# Patient Record
Sex: FEMALE | Race: WHITE | NOT HISPANIC OR LATINO | Employment: FULL TIME | URBAN - METROPOLITAN AREA
[De-identification: names, ages, dates, MRNs, and addresses within clinical notes are randomized per-mention and may not be internally consistent; named-entity substitution may affect disease eponyms.]

---

## 2023-11-09 ENCOUNTER — OFFICE VISIT (OUTPATIENT)
Dept: FAMILY MEDICINE CLINIC | Facility: CLINIC | Age: 33
End: 2023-11-09
Payer: OTHER GOVERNMENT

## 2023-11-09 VITALS
TEMPERATURE: 98.2 F | WEIGHT: 178.4 LBS | BODY MASS INDEX: 28 KG/M2 | DIASTOLIC BLOOD PRESSURE: 80 MMHG | HEART RATE: 76 BPM | SYSTOLIC BLOOD PRESSURE: 110 MMHG | HEIGHT: 67 IN | RESPIRATION RATE: 20 BRPM

## 2023-11-09 DIAGNOSIS — S93.402D SPRAIN OF LEFT ANKLE, UNSPECIFIED LIGAMENT, SUBSEQUENT ENCOUNTER: ICD-10-CM

## 2023-11-09 DIAGNOSIS — Z23 NEED FOR IMMUNIZATION AGAINST INFLUENZA: ICD-10-CM

## 2023-11-09 DIAGNOSIS — S52.124D CLOSED NONDISPLACED FRACTURE OF HEAD OF RIGHT RADIUS WITH ROUTINE HEALING, SUBSEQUENT ENCOUNTER: Primary | ICD-10-CM

## 2023-11-09 NOTE — PROGRESS NOTES
"Chief Complaint   Patient presents with    NP / establish PCP     FU from [facility] ER / R arm injury        Subjective      Renetta Garrido is a 33 y.o. who presents for right elbow and left ankle injury sustained in a fall yesterday.  Patient was running and she is training for half marathon.  She stepped on some loose gravel which resulted in a tumble down a hill side which caused multiple abrasions.  As the day progressed patient developed swelling and pain with loss of range of motion in the right elbow along with some swelling of the lateral ankle.  Patient presented to the local ER for evaluation.  We have some ER records which indicate the patient's diagnoses, including a right radial head fracture.  Patient is aware of the injury but does not know whether the fracture is displaced or not.  She was given the name of an orthopedist but needs official referral due to her insurance.  The right arm is in a posterior splint and sling        The following portions of the patient's history were reviewed and updated as appropriate: allergies, current medications, past family history, past medical history, past social history, past surgical history, and problem list.    Review of Systems    Objective   Vital Signs:  /80   Pulse 76   Temp 98.2 °F (36.8 °C)   Resp 20   Ht 170.2 cm (67\")   Wt 80.9 kg (178 lb 6.4 oz)   BMI 27.94 kg/m²               Physical Exam  Vitals reviewed.   Musculoskeletal:      Right elbow: Decreased range of motion.      Left ankle: Swelling present. No deformity or ecchymosis. Tenderness present. Normal range of motion.      Comments: Right upper extremity is in a posterior splint.  Patient has intact movement and sensation in the fingers.    Patient has swelling over the lateral malleolus with tenderness of the lateral malleolus.  She has intact range of motion in the left ankle with 5/5 strength with dorsiflexion, plantarflexion, eversion, inversion.  No bruising is present at this " time.  Patient is able to bear weight with only minor gait impairment   Skin:     Comments: Abrasion on right shoulder   Neurological:      Mental Status: She is alert.          Result Review                     Assessment and Plan  Diagnoses and all orders for this visit:    1. Closed nondisplaced fracture of head of right radius with routine healing, subsequent encounter (Primary)  Comments:  Maintain splint and sling at this time.  Refer to Ortho.  Records have been requested  Orders:  -     Ambulatory Referral to Orthopedic Surgery    2. Need for immunization against influenza  -     Fluzone >6 Months ([id number])    3. Sprain of left ankle, unspecified ligament, subsequent encounter  Comments:  Sprain is mild.  Activity as tolerated.  Recommended more supportive shoe than the sandals she was wearing in office            Follow Up  No follow-ups on file.  Patient was given instructions and counseling regarding her condition or for health maintenance advice. Please see specific information pulled into the AVS if appropriate.

## 2023-11-10 ENCOUNTER — OFFICE VISIT (OUTPATIENT)
Dept: FAMILY MEDICINE CLINIC | Facility: CLINIC | Age: 33
End: 2023-11-10
Payer: OTHER GOVERNMENT

## 2023-11-10 VITALS
DIASTOLIC BLOOD PRESSURE: 70 MMHG | WEIGHT: 178 LBS | TEMPERATURE: 98 F | HEART RATE: 72 BPM | SYSTOLIC BLOOD PRESSURE: 115 MMHG | HEIGHT: 67 IN | RESPIRATION RATE: 20 BRPM | BODY MASS INDEX: 27.94 KG/M2

## 2023-11-10 DIAGNOSIS — R22.31 LOCALIZED SWELLING ON RIGHT HAND: ICD-10-CM

## 2023-11-10 DIAGNOSIS — S52.124D CLOSED NONDISPLACED FRACTURE OF HEAD OF RIGHT RADIUS WITH ROUTINE HEALING, SUBSEQUENT ENCOUNTER: Primary | ICD-10-CM

## 2023-11-10 RX ORDER — MELOXICAM 7.5 MG/1
7.5 TABLET ORAL DAILY
COMMUNITY
Start: 2023-11-08

## 2023-11-10 NOTE — PROGRESS NOTES
"No chief complaint on file.      Subjective      Renetta Garrido is a 33 y.o. who presents for swelling of the right hand.  Patient was seen yesterday and has a known radial head fracture.  She is in a posterior splint.  When she woke this morning the hand was cool to the touch and had swelling on the ulnar aspect of the hand.  Patient is concerned about a blood clot.    Objective   Vital Signs:  /70   Pulse 72   Temp 98 °F (36.7 °C)   Resp 20   Ht 170.2 cm (67.01\")   Wt 80.7 kg (178 lb)   BMI 27.87 kg/m²     Physical Exam  Vitals reviewed.   Musculoskeletal:      Comments: Posterior splint was removed.  There is swelling of the right elbow both anteriorly and posteriorly around the joint.  There is no palpable tenderness of the arm above the elbow nor palpable cords.  There  is no forearm tenderness or palpable cords in the forearm.  Patient has intact range of motion in the  wrist and fingers.  There is mild swelling of the ulnar aspect of the hand.  There is also some erythema at the wrist joint where it would appear her hand was resting on the posterior splint.   Neurological:      Mental Status: She is alert.          Result Review                     Assessment and Plan  Diagnoses and all orders for this visit:    1. Closed nondisplaced fracture of head of right radius with routine healing, subsequent encounter (Primary)    2. Localized swelling on right hand      Plan: The mild swelling was likely due to either inability to move the arm due to the splint or perhaps the splint and Ace bandage combination limiting circulation or venous return.  There is no sign of venous thrombosis.  The posterior splint was removed and reapplied.  Patient will continue to use her sling.  She has been referred to orthopedics.      Follow Up  No follow-ups on file.  Patient was given instructions and counseling regarding her condition or for health maintenance advice. Please see specific information pulled into the " AVS if appropriate.

## 2023-11-13 ENCOUNTER — OFFICE VISIT (OUTPATIENT)
Dept: ORTHOPEDIC SURGERY | Facility: CLINIC | Age: 33
End: 2023-11-13
Payer: OTHER GOVERNMENT

## 2023-11-13 ENCOUNTER — TREATMENT (OUTPATIENT)
Age: 33
End: 2023-11-13
Payer: OTHER GOVERNMENT

## 2023-11-13 VITALS — BODY MASS INDEX: 27.92 KG/M2 | WEIGHT: 177.91 LBS | HEIGHT: 67 IN

## 2023-11-13 DIAGNOSIS — S52.121A CLOSED DISPLACED FRACTURE OF HEAD OF RIGHT RADIUS, INITIAL ENCOUNTER: Primary | ICD-10-CM

## 2023-11-13 DIAGNOSIS — M25.521 RIGHT ELBOW PAIN: ICD-10-CM

## 2023-11-13 PROCEDURE — 97161 PT EVAL LOW COMPLEX 20 MIN: CPT | Performed by: PHYSICAL THERAPIST

## 2023-11-13 PROCEDURE — 99204 OFFICE O/P NEW MOD 45 MIN: CPT | Performed by: STUDENT IN AN ORGANIZED HEALTH CARE EDUCATION/TRAINING PROGRAM

## 2023-11-13 PROCEDURE — 97110 THERAPEUTIC EXERCISES: CPT | Performed by: PHYSICAL THERAPIST

## 2023-11-13 RX ORDER — HYDROCODONE BITARTRATE AND ACETAMINOPHEN 5; 325 MG/1; MG/1
1 TABLET ORAL EVERY 6 HOURS
COMMUNITY
Start: 2023-11-09

## 2023-11-13 NOTE — PROGRESS NOTES
[facility] Orthopedic     Office Visit       Date: 11/13/2023   Patient Name: Renetta Garrido  MRN: [id number]  YOB: 1990    Referring Physician: No ref. provider found     Chief Complaint:   Chief Complaint   Patient presents with    Right Elbow - Pain     Fall 11/08/2023     History of Present Illness:   Renetta Garrido is a 33 y.o. female right-hand-dominant presented clinic with complaints of right elbow pain.  Patient sustained a mechanical fall from stand while running on 11/8/2023.  She presented to an outside hospital where x-rays were obtained demonstrating a minimally displaced right radial head fracture.  She was placed in a posterior slab splint and instructed to follow-up.  She reports that her pain and swelling have improved since splinting.  She has been using meloxicam and hydrocodone for pain control.  During the fall she also sprained her left ankle and had abrasions to her right knee.  Otherwise no other complaints or concerns.    Subjective   Review of Systems:   Review of Systems   Constitutional: Negative.  Negative for chills, fatigue and fever.   HENT: Negative.  Negative for congestion and dental problem.    Eyes: Negative.  Negative for blurred vision.   Respiratory: Negative.  Negative for shortness of breath.    Cardiovascular: Negative.  Negative for leg swelling.   Gastrointestinal: Negative.  Negative for abdominal pain.   Endocrine: Negative.  Negative for polyuria.   Genitourinary: Negative.  Negative for difficulty urinating.   Musculoskeletal:  Positive for arthralgias.   Skin: Negative.    Allergic/Immunologic: Negative.    Neurological: Negative.    Hematological: Negative.  Negative for adenopathy.   Psychiatric/Behavioral: Negative.  Negative for behavioral problems.       Pertinent review of systems per HPI    I reviewed the patient's chief complaint, history of present illness, review of  "systems, past medical history, surgical history, family history, social history, medications and allergy list in the EMR on 11/13/2023 and agree with the findings above.    Objective    Quality Measures:   ACP:   ACP discussion was declined by the patient.      Tobacco:   Renetta Garrido  reports that she has quit smoking. Her smoking use included cigarettes. She has never used smokeless tobacco..     Vital Signs:   Vitals:    11/13/23 0833   Weight: 80.7 kg (177 lb 14.6 oz)   Height: 170.2 cm (67.01\")     BMI:      General: No acute distress. Alert and oriented.     Ortho Exam:  Examination of the right upper extremity demonstrates no deformity.  No skin lesions or abrasions.  Patient is mild to moderately tender along the radiocapitellar joint.  She has no pain with short arc range of motion.  She is able to achieve 100 degrees of elbow flexion and lacks terminal 40 degrees of full extension.  She has full pronation and supination without mechanical block.  Sensation intact light touch throughout the hand.  She will make composite fist.  Warm and well-perfused distally.    Imaging / Studies:    Imaging Results (Last 24 Hours)       ** No results found for the last 24 hours. **        Right elbow x-rays obtained on 11/10/2023 were presented on a disc and personally reviewed.  These demonstrate a minimally displaced intra-articular radial head fracture.  There is less than 1 mm of displacement noted on the AP view.  Anterior and posterior fat pad sign is noted.  No other acute needs.    Assessment / Plan    Assessment/Plan:   Renetta Garridois a 33 y.o. female with minimally displaced right radial head intra-articular fracture, DOI 10/8/2023.    I discussed with the patient their clinical and radiographic findings demonstrate intra-articular minimally displaced right radial head fracture..  We had a lengthy discussion regarding the pathophysiology of their diagnosis.  Both conservative and surgical options were " discussed.  I discussed her x-rays which demonstrate minimal displacement and her exam demonstrates no mechanical blocks to forearm pronosupination.  Therefore her fracture is within acceptable parameters for nonoperative interventions.  Conservative treatments in the form of: Sling use with range of motion exercises were presented.  I would like her to meet with our hand therapist for a home exercise program.  A prescription was placed by her hand therapist in the office today.  I like her to return to clinic in 2 weeks time for repeat x-rays ration of motion.  I discussed that bone healing will take 6 to 8 weeks and to expect limited weightbearing and lifting during that time.  They were agreeable with the plan.  All questions and concerns were addressed.        ICD-10-CM ICD-9-CM   1. Closed displaced fracture of head of right radius, initial encounter  S52.121A 813.05     Follow Up:   Return in about 2 weeks (around 11/27/2023) for Follow Up with right elbow xray.      Elizabeth Allen MD  [facility]

## 2023-11-13 NOTE — PROGRESS NOTES
Physical Therapy Initial Evaluation and Plan of Care  [facility]   [address], [address]  [place] [id number]        Patient: Renetta Garrido   : 1990  Diagnosis/ICD-10 Code:  Closed displaced fracture of head of right radius, initial encounter [S52.121A]  Referring practitioner: Elizabeth Allen MD  Date of Initial Visit: 2023  Today's Date: 2023  Patient seen for 1 sessions         Visit Diagnoses:    ICD-10-CM ICD-9-CM   1. Closed displaced fracture of head of right radius, initial encounter  S52.121A 813.05   2. Right elbow pain  M25.521 719.42           Subjective Evaluation    History of Present Illness  Date of onset: 2023  Mechanism of injury: 33 y.o. female right-hand-dominant presented clinic with complaints of right elbow pain.  Patient sustained a mechanical fall from stand while running on 2023.  She presented to an outside hospital where x-rays were obtained demonstrating a minimally displaced right radial head fracture.  She was placed in a posterior slab splint and instructed to follow-up.  She reports that her pain and swelling have improved since splinting.  She has been using meloxicam and hydrocodone for pain control.  During the fall she also sprained her left ankle and had abrasions to her right knee.  She is mother of 3.      Quality of life: good    Pain  Location: Constant right lateral and posterior elbow pain. No paraesthesia/anaesthesia  Quality: dull ache, tight, throbbing, pulling and squeezing  Relieving factors: rest, support and ice  Aggravating factors: movement, lifting and repetitive movement    Hand dominance: right    Diagnostic Tests  X-ray: abnormal             Objective          Observations     Right Shoulder  Positive for abrasion (Right superior shoulder).     Right Elbow   Positive for edema.       Tenderness     Right Elbow   Tenderness in the olecranon process and radial head.     Right Wrist/Hand   Tenderness in  the olecranon process.     Neurological Testing     Sensation     Elbow     Right Elbow   Intact: light touch    Active Range of Motion     Right Shoulder   Normal active range of motion    Right Elbow   Flexion: 112 degrees with pain  Extension: 20 degrees with pain  Forearm supination: 65 degrees with pain  Forearm pronation: 75 degrees with pain    Right Wrist   Wrist flexion: WFL  Wrist extension: WFl  Radial deviation: WFL  Ulnar deviation: WFL    Strength/Myotome Testing     Left Wrist/Hand      (2nd hand position)   Left  strength (2nd hand position) 70 lbs    Right Wrist/Hand      (2nd hand position)   Right  strength (2nd hand position) 29 lbs    Additional Strength Details  Not assessed due fracture precaution    Tests     Additional Tests Details  Not assessed due to fracture precaution.          Assessment & Plan       Assessment  Impairments: abnormal or restricted ROM, activity intolerance, impaired physical strength and weight-bearing intolerance   Functional limitations: carrying objects, pulling and uncomfortable because of pain   Assessment details: 33 year old right handed female with a right elbow radial head fracture, slight displacement, from a slip and fall while running on 11/8/2023.  sh has lost ROM at elbow in all planes of motion and has some strength deficits. . Shoulder and wrist ROM deficits present today.  Neurological exam is normal today.  Prognosis: good    Goals  Plan Goals: TO BE MET TODAY  1.  Pt is independent with HEP.  LTG TO BE MET IN 12 Weeks  1.  Pt has full elbow extension  2.  Pt is able to  60 lbs without pain  3.  Pt is able bear weight through right UE with tolerable pain.    Plan  Therapy options: will be seen for skilled therapy services  Planned therapy interventions: home exercise program, functional ROM exercises, strengthening, stretching, therapeutic activities and joint mobilization  Frequency: 2x month  Duration in visits: 6        Timed:          Manual Therapy:         mins  50722;     Therapeutic Exercise:    10     mins  33636;     Neuromuscular Marisela:        mins  97580;    Therapeutic Activity:          mins  20529;     Gait Training:           mins  42635;     Ultrasound:          mins  37016;    Ionto                                   mins   30043  Self Care                            mins   13599  Canalith Repos         mins 20341      Un-Timed:  Electrical Stimulation:         mins  68065 ( );  Dry Needling          mins self-pay  Traction          mins 58949  Low Eval     15     Mins  85804  Mod Eval          Mins  53310  High Eval                            Mins  16463        Timed Treatment:   10   mins   Total Treatment:     25   mins          PT: Iggy Bolden PT,T  License Number: [id number]  Electronically signed by Iggy Bolden PT, 11/13/23, 6:53 PM EST    Initial Certification  Certification Period: 2/11/2024  I certify that the therapy services are furnished while this patient is under my care.  The services outlined above are required by this patient, and will be reviewed every 90 days.     PHYSICIAN: ________________________________________________________  Elizabeth Allen MD        DATE: ____________________________________________________________    Please sign and return via fax to [phone].. Thank you, [facility].

## 2023-11-17 ENCOUNTER — TELEPHONE (OUTPATIENT)
Dept: FAMILY MEDICINE CLINIC | Facility: CLINIC | Age: 33
End: 2023-11-17

## 2023-11-17 ENCOUNTER — PATIENT ROUNDING (BHMG ONLY) (OUTPATIENT)
Dept: FAMILY MEDICINE CLINIC | Facility: CLINIC | Age: 33
End: 2023-11-17
Payer: OTHER GOVERNMENT

## 2023-11-17 DIAGNOSIS — Z12.4 CERVICAL CANCER SCREENING: Primary | ICD-10-CM

## 2023-11-17 NOTE — PROGRESS NOTES
A My-Chart message has been sent to the patient for PATIENT ROUNDING with [facility].

## 2023-11-17 NOTE — TELEPHONE ENCOUNTER
Caller: Renetta Garrido    Relationship: Self    Best call back number: [phone]    What is the best time to reach you: ANYTIME     Who are you requesting to speak with (clinical staff, provider,  specific staff member): CLINICAL STAFF    What was the call regarding: PATIENT IS CALLING TO SEE ABOUT GETTING A REFERRAL FOR AN OBGYN. SHE IS NEEDING TO GET A YEARLY PHYSICAL WITH A PAP.     Is it okay if the provider responds through [employer]: YES

## 2023-11-27 ENCOUNTER — OFFICE VISIT (OUTPATIENT)
Dept: ORTHOPEDIC SURGERY | Facility: CLINIC | Age: 33
End: 2023-11-27
Payer: OTHER GOVERNMENT

## 2023-11-27 VITALS
DIASTOLIC BLOOD PRESSURE: 78 MMHG | HEIGHT: 67 IN | SYSTOLIC BLOOD PRESSURE: 112 MMHG | BODY MASS INDEX: 27.78 KG/M2 | WEIGHT: 177 LBS

## 2023-11-27 DIAGNOSIS — S52.121A CLOSED DISPLACED FRACTURE OF HEAD OF RIGHT RADIUS, INITIAL ENCOUNTER: Primary | ICD-10-CM

## 2023-11-27 DIAGNOSIS — S69.81XD INJURY OF TRIANGULAR FIBROCARTILAGE COMPLEX (TFCC) OF RIGHT WRIST, SUBSEQUENT ENCOUNTER: ICD-10-CM

## 2023-11-27 PROBLEM — S69.81XA INJURY OF TRIANGULAR FIBROCARTILAGE COMPLEX OF RIGHT WRIST: Status: ACTIVE | Noted: 2023-11-27

## 2023-11-27 PROCEDURE — 99213 OFFICE O/P EST LOW 20 MIN: CPT | Performed by: STUDENT IN AN ORGANIZED HEALTH CARE EDUCATION/TRAINING PROGRAM

## 2023-11-27 NOTE — PROGRESS NOTES
[facility] Orthopedic     Office Visit       Date: 11/27/2023   Patient Name: Renetta Garrido  MRN: [id number]  YOB: 1990    Referring Physician: No ref. provider found     Chief Complaint:   Chief Complaint   Patient presents with    Follow-up     2 week follow up - minimally displaced right radial head intra-articular fracture (DOI: 11/08/2023)     History of Present Illness:   Renetta Garrido is a 33 y.o. female right-hand-dominant presented to clinic for follow-up of right radial head fracture, DOI 11/8/2023.  Patient's been doing well since her last clinic visit.  She been working on range of motion and has noted improvements.  Is weaned out of her sling.  Her pain is also improved.  She does endorse some intermittent ulnar-sided wrist pain with lifting and gripping activities.  Overall she feels she is improving.  No numbness or tingling.  No reinjury.  No other complaints or concerns.    Subjective   Review of Systems:   Review of Systems   Constitutional: Negative.  Negative for chills, fatigue and fever.   HENT: Negative.  Negative for congestion and dental problem.    Eyes: Negative.  Negative for blurred vision.   Respiratory: Negative.  Negative for shortness of breath.    Cardiovascular: Negative.  Negative for leg swelling.   Gastrointestinal: Negative.  Negative for abdominal pain.   Endocrine: Negative.  Negative for polyuria.   Genitourinary: Negative.  Negative for difficulty urinating.   Musculoskeletal:  Positive for arthralgias.   Skin: Negative.    Allergic/Immunologic: Negative.    Neurological: Negative.    Hematological: Negative.  Negative for adenopathy.   Psychiatric/Behavioral: Negative.  Negative for behavioral problems.         Pertinent review of systems per HPI    I reviewed the patient's chief complaint, history of present illness, review of systems, past medical history, surgical history, family  "history, social history, medications and allergy list in the EMR on 11/27/2023 and agree with the findings above.    Objective    Quality Measures:   ACP:   ACP discussion was declined by the patient.      Tobacco:   Renetta Garrido  reports that she has quit smoking. Her smoking use included cigarettes. She has never used smokeless tobacco..     Vital Signs:   Vitals:    11/27/23 0804   BP: 112/78   Weight: 80.3 kg (177 lb)   Height: 170.2 cm (67.01\")     BMI:      General: No acute distress. Alert and oriented.     Ortho Exam:  Examination of the right upper extremity demonstrates no deformity.  No skin lesions or abrasions.  Mildly tender at the radial head and neck.  Nontender at the lateral epicondyle and medial epicondyle.  Tenderness at the ulnar fovea and pain noted with TFCC ballottement.  Nontender at the ECU and negative ECU Synergy test.  DRUJ stable.  Patient has full elbow flexion and achieves full extension.  Full pronation and supination without mechanical block.  She is able to make a composite fist.  Sensation is intact to light touch throughout the hand.  Warm and well-perfused distally.    Imaging / Studies:    Imaging Results (Last 24 Hours)       Procedure Component Value Units Date/Time    XR Elbow 3+ View Right [[id number]] Resulted: 11/27/23 0905     Updated: 11/27/23 0907    Narrative:      Right Elbow X-Ray    Indication: Pain    Views: AP, oblique and Lateral     Comparison:  11/10/2023    Findings:  Minimally displaced radial head fracture that is unchanged from prior   imaging, maintained congruency at the radiocapitellar joint. Normal soft   tissues. Normal joint spaces.    Impression: Minimally displaced radial head fracture without interval   displacement.                     Assessment / Plan    Assessment/Plan:   Renetta Garridois a 33 y.o. female with minimally displaced radial head fracture, DOI 11/8/2023, and right TFCC injury.    Patient continues to do well with range of motion " and pain to the right elbow.  She may discontinue use of her sling and continue range of motion exercises.  Regarding her wrist, her exam demonstrates a TFCC injury.  She reports her pain is mild, so I would like her to obtain a wrist widget brace and use anti-inflammatories as needed.  I will see her back in 4 weeks and if she is continuing to have pain to the right wrist I will order an x-ray and may consider a corticosteroid injection at that time.  She expressed understanding of the plan moving forward.  All questions and concerns were addressed.    ICD-10-CM ICD-9-CM   1. Closed displaced fracture of head of right radius, initial encounter  S52.121A 813.05   2. 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      Elizabeth Allen MD  [facility]

## 2024-01-08 ENCOUNTER — OFFICE VISIT (OUTPATIENT)
Dept: ORTHOPEDIC SURGERY | Facility: CLINIC | Age: 34
End: 2024-01-08
Payer: OTHER GOVERNMENT

## 2024-01-08 VITALS — WEIGHT: 186 LBS | HEIGHT: 67 IN | BODY MASS INDEX: 29.19 KG/M2

## 2024-01-08 DIAGNOSIS — S93.492A SPRAIN OF ANTERIOR TALOFIBULAR LIGAMENT OF LEFT ANKLE, INITIAL ENCOUNTER: ICD-10-CM

## 2024-01-08 DIAGNOSIS — Z09 FRACTURE FOLLOW-UP: ICD-10-CM

## 2024-01-08 DIAGNOSIS — S52.121D CLOSED DISPLACED FRACTURE OF HEAD OF RIGHT RADIUS WITH ROUTINE HEALING, SUBSEQUENT ENCOUNTER: Primary | ICD-10-CM

## 2024-01-08 PROCEDURE — 99213 OFFICE O/P EST LOW 20 MIN: CPT | Performed by: STUDENT IN AN ORGANIZED HEALTH CARE EDUCATION/TRAINING PROGRAM

## 2024-01-08 NOTE — PROGRESS NOTES
[facility]     Office Visit       Date: 01/08/2024   Patient Name: Renetta Garrido  MRN: [id number]  YOB: 1990    Referring Physician: No ref. provider found     Chief Complaint:   Chief Complaint   Patient presents with    Follow-up     4 wk f/u - minimally displaced right radial head intra-articular fracture (DOI: 11/08/2023)     History of Present Illness:   Renetta Garrido is a 33 y.o. female right-hand-dominant presented to clinic for follow-up of right radial head fracture, DOI 11/8/2023.  The patient has been doing well since her last clinic visit.  She denies any right elbow pain.  She only occasionally will note some discomfort with certain movements of her hand otherwise no complaints.  She does however endorse continued left ankle pain since the injury.  Patient is an avid runner and states that she has been unable to run secondary to pain.  Pain is diffuse to the ankle but is worse along the lateral side.  No other complaints or concerns.  No reinjury.    Subjective   Review of Systems:   Review of Systems   Constitutional:  Negative for chills, fever, unexpected weight gain and unexpected weight loss.   HENT:  Negative for congestion, postnasal drip and rhinorrhea.    Eyes:  Negative for blurred vision.   Respiratory:  Negative for shortness of breath.    Cardiovascular:  Negative for leg swelling.   Gastrointestinal:  Negative for abdominal pain, nausea and vomiting.   Genitourinary:  Negative for difficulty urinating.   Musculoskeletal:  Positive for arthralgias. Negative for gait problem, joint swelling and myalgias.   Skin:  Negative for skin lesions and wound.   Neurological:  Negative for dizziness, weakness, light-headedness and numbness.   Hematological:  Does not bruise/bleed easily.   Psychiatric/Behavioral:  Negative for depressed mood.         Pertinent review of systems per HPI    I reviewed the  "patient's chief complaint, history of present illness, review of systems, past medical history, surgical history, family history, social history, medications and allergy list in the EMR on 01/08/2024 and agree with the findings above.    Objective    Quality Measures:   ACP:   ACP discussion was declined by the patient.      Tobacco:   Renetta Garrido  reports that she has quit smoking. Her smoking use included cigarettes. She has never used smokeless tobacco..     Vital Signs:   Vitals:    01/08/24 0807   Weight: 84.4 kg (186 lb)   Height: 170.2 cm (67.01\")     BMI:      General: No acute distress. Alert and oriented.     Ortho Exam:  Examination of the right upper extremity demonstrates no deformity.  No skin lesions or abrasions.  She is nontender at the radial head, radiocapitellar joint, lateral epicondyle, and medial epicondyle.  She has full elbow flexion, extension, pronation and supination.  The wrist is nontender at the DRUJ, ulnar styloid, ECU tendon, ulnar fovea.  Sensations intact light touch throughout the hand.  Warm and well-perfused distally.    Examination of the left lower extremity demonstrates no obvious swelling.  No ecchymosis.  No deformity.  She is mildly tender along the ATFL.  Nontender at the deltoid.  Tenderness diffusely along the peroneal tendons.  Negative anterior drawer.  Full ankle flexion, extension, inversion and eversion.  Sensation intact light touch throughout the ankle.  Warm and well-perfused distally.    Imaging / Studies:    Imaging Results (Last 24 Hours)       Procedure Component Value Units Date/Time    XR Elbow 3+ View Right [[id number]] Resulted: 01/08/24 0825     Updated: 01/08/24 0827    Narrative:      Right Elbow X-Ray    Indication: Pain    Views: AP, oblique and Lateral     Comparison:  11/27/2023    Findings:  Right intra-articular radial head fracture with less than 1 mm of   displacement.  No significant change from prior films.  No bony lesions.   Normal soft " tissues. Normal joint spaces.    Impression: Right radial head intra-articular fracture in stable   alignment.                     Assessment / Plan    Assessment/Plan:   Renetta Garrido is a 33 y.o. female with right radial head fracture and a left ankle fracture, ATFL, DOI 11/8/2023.    Regarding her right elbow, she is doing well.  She has minimal to no pain with full range of motion on exam.  Her x-rays demonstrate no interval change in fracture alignment.  Therefore she may continue range of motion as she tolerates.  I did advise no weightbearing to the extremity for another 2 weeks.  Regarding her left ankle, I think she has had a sprain to the ATFL with some residual tendinitis of the peroneal tendons.  I would like her to complete a course of physical therapy directed exercises.  A prescription was provided today.  I will see her back in 2 months for reevaluation of pain.  The patient was agreeable to plan.  All questions and concerns were addressed.      ICD-10-CM ICD-9-CM   1. Closed displaced fracture of head of right radius with routine healing, subsequent encounter  S52.121D V54.12   2. Sprain of anterior talofibular ligament of left ankle, initial encounter  S93.492A 845.09   3. Fracture follow-up  Z09 V67.4     Follow Up:   Return in about 2 months (around 3/8/2024) for Follow Up.      Elizabeth Allen MD  [facility] [place] [facility]

## 2024-01-09 ENCOUNTER — TELEPHONE (OUTPATIENT)
Dept: FAMILY MEDICINE CLINIC | Facility: CLINIC | Age: 34
End: 2024-01-09

## 2024-01-09 NOTE — TELEPHONE ENCOUNTER
Caller: Renetta Garrido    Relationship: Self    Best call back number:     What is the medical concern/diagnosis:  LEFT ANKLE    What specialty or service is being requested: PHYSICAL THERAPY    What is the provider, practice or medical service name: [facility] PHYSICAL [facility]    What is the office location: [facility]    What is the office phone number: [phone]    Any additional details: PATIENT WAS SEEN BY ORTHOPEDICS FOR HER ANKLE AND ELBOW; SHE HAS THE REFERRAL FOR HER ELBOW BUT NOW NEEDS ONE TO BE SENT IN FOR HER LEFT ANKLE    PLEASE CALL T DEIDRA

## 2024-01-10 NOTE — TELEPHONE ENCOUNTER
PATIENT CALLED ME THIS MORNING STATING FOR INSURANCE TO PAY FOR PT THAT THEY NEEDED AUTHORIZATION FROM OUR OFFICE. I WENT AHEAD AN GOT THE AUTH FROM [employer] FOR HER ANKLE.     FOR HER ELBOW SOMEONE ELSE RECEIVED THAT AUTH FROM [facility], BUT THE AUTHS ARE SUPPOSE TO COME FROM HER PCP OFFICE NOT SPECIALITIES SO UNSURE WHO RECEIVED THAT AUTH.

## 2024-02-15 ENCOUNTER — OFFICE VISIT (OUTPATIENT)
Dept: OBSTETRICS AND GYNECOLOGY | Facility: CLINIC | Age: 34
End: 2024-02-15
Payer: OTHER GOVERNMENT

## 2024-02-15 VITALS
WEIGHT: 180 LBS | SYSTOLIC BLOOD PRESSURE: 100 MMHG | HEIGHT: 67 IN | DIASTOLIC BLOOD PRESSURE: 80 MMHG | BODY MASS INDEX: 28.25 KG/M2

## 2024-02-15 DIAGNOSIS — Z01.419 PAP TEST, AS PART OF ROUTINE GYNECOLOGICAL EXAMINATION: Primary | ICD-10-CM

## 2024-02-15 DIAGNOSIS — B00.9 HSV INFECTION: ICD-10-CM

## 2024-02-15 RX ORDER — VALACYCLOVIR HYDROCHLORIDE 500 MG/1
1 TABLET, FILM COATED ORAL DAILY
COMMUNITY
Start: 2024-01-11

## 2024-02-15 RX ORDER — VALACYCLOVIR HYDROCHLORIDE 1 G/1
1000 TABLET, FILM COATED ORAL DAILY
Qty: 30 TABLET | Refills: 12 | Status: SHIPPED | OUTPATIENT
Start: 2024-02-15

## 2024-02-16 ENCOUNTER — PATIENT ROUNDING (BHMG ONLY) (OUTPATIENT)
Dept: OBSTETRICS AND GYNECOLOGY | Facility: CLINIC | Age: 34
End: 2024-02-16
Payer: OTHER GOVERNMENT

## 2024-02-20 LAB — REF LAB TEST METHOD: NORMAL

## 2024-02-26 ENCOUNTER — TELEPHONE (OUTPATIENT)
Dept: OBSTETRICS AND GYNECOLOGY | Facility: CLINIC | Age: 34
End: 2024-02-26
Payer: OTHER GOVERNMENT

## 2024-02-26 NOTE — TELEPHONE ENCOUNTER
Called and informed patient of ASCUS pap smear with negative HPV and explained the acronym but also explained that some of her cells did not appear normal but it was not due to HPV. It could be anything from an infection like yeast, to residual from her cycle, or an abundance of old cells, as our skin generates new cells. Patient voiced understanding.

## 2024-03-21 ENCOUNTER — OFFICE VISIT (OUTPATIENT)
Dept: ORTHOPEDIC SURGERY | Facility: CLINIC | Age: 34
End: 2024-03-21
Payer: OTHER GOVERNMENT

## 2024-03-21 VITALS
WEIGHT: 176 LBS | HEIGHT: 67 IN | SYSTOLIC BLOOD PRESSURE: 110 MMHG | BODY MASS INDEX: 27.62 KG/M2 | DIASTOLIC BLOOD PRESSURE: 80 MMHG

## 2024-03-21 DIAGNOSIS — S52.121D CLOSED DISPLACED FRACTURE OF HEAD OF RIGHT RADIUS WITH ROUTINE HEALING, SUBSEQUENT ENCOUNTER: Primary | ICD-10-CM

## 2024-03-21 NOTE — PROGRESS NOTES
[facility]     Office Visit       Date: 03/21/2024   Patient Name: Renetta Garrido  MRN: [id number]  YOB: 1990    Referring Physician: No ref. provider found     Chief Complaint:   Chief Complaint   Patient presents with    Follow-up     2.5 month f/u; minimally displaced right radial head intra-articular fracture (DOI: 11/08/2023)     History of Present Illness:   Renetta Garrido is a 34 y.o. female with right radial head fracture and left ankle ATFL sprain  DOI 11/8/2023.  Patient been doing well since her last clinic visit.  She completed a course of physical therapy for her right elbow and her left ankle.  She reports significant improvements.  She has returned to all of her activities without restriction.  She has been able to perform push-ups without significant elbow pain.  She is also training for a half marathon and has no left ankle pain.    Subjective   Review of Systems:   Review of Systems   Constitutional:  Negative for chills, fever, unexpected weight gain and unexpected weight loss.   HENT:  Negative for congestion, postnasal drip and rhinorrhea.    Eyes:  Negative for blurred vision.   Respiratory:  Negative for shortness of breath.    Cardiovascular:  Negative for leg swelling.   Gastrointestinal:  Negative for abdominal pain, nausea and vomiting.   Genitourinary:  Negative for difficulty urinating.   Musculoskeletal:  Positive for arthralgias. Negative for gait problem, joint swelling and myalgias.   Skin:  Negative for skin lesions and wound.   Neurological:  Negative for dizziness, weakness, light-headedness and numbness.   Hematological:  Does not bruise/bleed easily.   Psychiatric/Behavioral:  Negative for depressed mood.       Pertinent review of systems per HPI    I reviewed the patient's chief complaint, history of present illness, review of systems, past medical history, surgical history, family  "history, social history, medications and allergy list in the EMR on 03/21/2024 and agree with the findings above.    Objective    Quality Measures:   ACP:   ACP discussion was declined by the patient.      Tobacco:   Renetta Garrido  reports that she has quit smoking. Her smoking use included cigarettes. She has never used smokeless tobacco.     Vital Signs:   Vitals:    03/21/24 1311   BP: 110/80   Weight: 79.8 kg (176 lb)   Height: 170.2 cm (67.01\")     BMI:      General: No acute distress. Alert and oriented.     Ortho Exam:  Examination of the right upper extremity demonstrates no deformity.  No skin lesions or abrasions.  There is no swelling or ecchymosis.  No palpable joint effusion.  She is nontender along the radial head and radiocapitellar articulation.  She has full elbow flexion, extension, pronation and supination.  Sensation is intact to light touch throughout the hand.  Warm well-perfused distally.    Imaging / Studies:    Imaging Results (Last 24 Hours)       Procedure Component Value Units Date/Time    XR Elbow 3+ View Right [[id number]] Resulted: 03/21/24 1326     Updated: 03/21/24 1332    Narrative:      Right Elbow X-Ray    Indication: Pain    Views: AP, oblique and Lateral     Comparison:  1/8/2024    Findings:  Minimally displaced intra-articular radial head fracture.  There is   interval healing along the fracture line.  Normal soft tissues. Normal   joint spaces.    Impression:   Healing minimally displaced intra articular radial head fracture.                     Assessment / Plan    Assessment/Plan:   Renetta Garrido is a 34 y.o. female with right radial head fracture and left ankle ATFL sprain DOI 11/8/2023.     Overall the patient has been doing well since her last clinic visit.  She has full painless range of motion noted at the right elbow.  She may continue all of her activities without restriction.  She may return to clinic as needed.  All questions and concerns were addressed.  She " was agreeable with the plan.      ICD-10-CM ICD-9-CM   1. Closed displaced fracture of head of right radius with routine healing, subsequent encounter  S52.121D V54.12     Follow Up:   Return if symptoms worsen or fail to improve.      Elizabeth Allen MD  [facility] [place] [place] [facility] Sur[facility]

## 2024-04-09 ENCOUNTER — OFFICE VISIT (OUTPATIENT)
Dept: FAMILY MEDICINE CLINIC | Facility: CLINIC | Age: 34
End: 2024-04-09
Payer: OTHER GOVERNMENT

## 2024-04-09 VITALS
SYSTOLIC BLOOD PRESSURE: 110 MMHG | WEIGHT: 182.2 LBS | BODY MASS INDEX: 28.6 KG/M2 | TEMPERATURE: 97.3 F | OXYGEN SATURATION: 99 % | HEART RATE: 48 BPM | HEIGHT: 67 IN | DIASTOLIC BLOOD PRESSURE: 76 MMHG | RESPIRATION RATE: 16 BRPM

## 2024-04-09 DIAGNOSIS — J01.00 ACUTE NON-RECURRENT MAXILLARY SINUSITIS: Primary | ICD-10-CM

## 2024-04-09 PROCEDURE — 99213 OFFICE O/P EST LOW 20 MIN: CPT | Performed by: FAMILY MEDICINE

## 2024-04-09 RX ORDER — AMOXICILLIN AND CLAVULANATE POTASSIUM 875; 125 MG/1; MG/1
1 TABLET, FILM COATED ORAL 2 TIMES DAILY
Qty: 14 TABLET | Refills: 0 | Status: SHIPPED | OUTPATIENT
Start: 2024-04-09

## 2024-04-09 NOTE — PROGRESS NOTES
"Chief Complaint   Patient presents with    Sore Throat     Sore/scratchy throat, sinus congestion, no fever, drainage - was clear and is now green, started Wednesday afternoon.       Subjective      Renetta Garrido is a 34 y.o. who presents for sinusitis that has developed after 1 week of URI symptoms. She has bilateral maxillary sinus pain with green nasal discharge.    Objective   Vital Signs:  /76   Pulse (!) 48   Temp 97.3 °F (36.3 °C)   Resp 16   Ht 170.2 cm (67\")   Wt 82.6 kg (182 lb 3.2 oz)   SpO2 99%   BMI 28.54 kg/m²     Physical Exam  Constitutional:       Appearance: Normal appearance.   HENT:      Head: Normocephalic and atraumatic.      Right Ear: Tympanic membrane and ear canal normal.      Left Ear: Tympanic membrane and ear canal normal.      Nose: Nose normal.      Comments: Right maxillary sinus tenderness     Mouth/Throat:      Mouth: Mucous membranes are moist.      Pharynx: Oropharynx is clear.   Eyes:      Conjunctiva/sclera: Conjunctivae normal.   Cardiovascular:      Rate and Rhythm: Normal rate and regular rhythm.      Heart sounds: Normal heart sounds. No murmur heard.  Pulmonary:      Effort: Pulmonary effort is normal. No respiratory distress.      Breath sounds: Normal breath sounds.   Musculoskeletal:      Cervical back: Normal range of motion and neck supple. No tenderness.   Lymphadenopathy:      Cervical: No cervical adenopathy.   Skin:     General: Skin is warm and dry.   Neurological:      Mental Status: She is alert.   Psychiatric:         Mood and Affect: Mood normal.          Result Review                     Assessment and Plan  Diagnoses and all orders for this visit:    1. Acute non-recurrent maxillary sinusitis (Primary)  -     amoxicillin-clavulanate (AUGMENTIN) 875-125 MG per tablet; Take 1 tablet by mouth 2 (Two) Times a Day.  Dispense: 14 tablet; Refill: 0            Follow Up  No follow-ups on file.  Patient was given instructions and counseling regarding " her condition or for health maintenance advice. Please see specific information pulled into the AVS if appropriate.

## 2024-06-17 ENCOUNTER — TELEPHONE (OUTPATIENT)
Dept: FAMILY MEDICINE CLINIC | Facility: CLINIC | Age: 34
End: 2024-06-17
Payer: OTHER GOVERNMENT

## 2024-06-17 NOTE — TELEPHONE ENCOUNTER
Questioned pt who had been completing her positive tests? She said, she has had two positives in the last year. I informed her she would need an appt to discuss this with Dr. Vazquez further. She stated, she would just wait until her next fu appt.

## 2024-06-17 NOTE — TELEPHONE ENCOUNTER
Caller: Renetta Garrido    Relationship: Self    Best call back number: [phone]    What is the best time to reach you: ANY TIME    Who are you requesting to speak with (clinical staff, provider,  specific staff member): NURSE    Do you know the name of the person who called: SELF    What was the call regarding: PATIENT GETS RECURRING STREP AND IS CONCERNED AND BELIEVES SHE NEEDS HER TONSILS REMOVED. PLEASE ADVISE

## 2024-08-14 ENCOUNTER — NURSE TRIAGE (OUTPATIENT)
Dept: CALL CENTER | Facility: HOSPITAL | Age: 34
End: 2024-08-14
Payer: OTHER GOVERNMENT

## 2024-08-14 NOTE — TELEPHONE ENCOUNTER
HUB--pt transferred to me from [facility] due to stating she had blood in her stool and abdominal pain.  Spoke with patient, she has been having some abd pain mainly on left side as well as some blood in stool but more today.  She couldn't tell me if there were any clots or not in her stool, just that it was more so today, dark red mixed in with stool.  Hx of constipation and hemorrhoids noted.  She is training for a half marathon as well and exercising a lot.  Pt does not feel like passing out, dizzy or nv noted.  Care advice given, see PCP within 2-3 days, sitz baths, hydrocortisone cream prn, call back if she has worsening s/s or increased blood or go to ED if that happens.  Pt verbalizes understanding.  Transferred to office and appt made.  Pt hung up during the transfer and the office is going to reach out to her now to make the appt.

## 2024-08-14 NOTE — TELEPHONE ENCOUNTER
"Reason for Disposition  • MILD rectal bleeding (more than just a few drops or streaks)    Additional Information  • Negative: Shock suspected (e.g., cold/pale/clammy skin, too weak to stand, low BP, rapid pulse)  • Negative: Difficult to awaken or acting confused (e.g., disoriented, slurred speech)  • Negative: Passed out (i.e., lost consciousness, collapsed and was not responding)  • Negative: [1] Vomiting AND [2] contains red blood or black (\"coffee ground\") material  (Exception: Few red streaks in vomit that only happened once.)  • Negative: Sounds like a life-threatening emergency to the triager  • Negative: Diarrhea is main symptom  • Negative: Stool color other than brown or tan is main concern  (no bleeding and no melena)  • Negative: SEVERE rectal bleeding (large blood clots; constant or on and off bleeding)  • Negative: SEVERE dizziness (e.g., unable to stand, requires support to walk, feels like passing out now)  • Negative: [1] MODERATE rectal bleeding (small blood clots, passing blood without stool, or toilet water turns red) AND [2] more than once a day  • Negative: Pale skin (pallor) of new-onset or worsening  • Negative: Black or tarry bowel movements  (Exception: Chronic-unchanged black-grey BMs AND is taking iron pills or Pepto-Bismol.)  • Negative: [1] Constant abdominal pain AND [2] present > 2 hours  • Negative: Rectal foreign body (i.e., now or within past week;  inserted or swallowed)  • Negative: High-risk adult (e.g., prior surgery on aorta, abdominal aortic aneurysm)  • Negative: Taking Coumadin (warfarin) or other strong blood thinner, or known bleeding disorder (e.g., thrombocytopenia)  • Negative: Known cirrhosis of the liver (or history of liver failure or ascites)  • Negative: [1] Colonoscopy AND [2] in past 72 hours  • Negative: Patient sounds very sick or weak to the triager  • Negative: MODERATE rectal bleeding (small blood clots, passing blood without stool, or toilet water turns " "red)    Answer Assessment - Initial Assessment Questions  1. APPEARANCE of BLOOD: \"What color is it?\" \"Is it passed separately, on the surface of the stool, or mixed in with the stool?\"       Dark red mixed with stool   2. AMOUNT: \"How much blood was passed?\"       A lot per patient   3. FREQUENCY: \"How many times has blood been passed with the stools?\"       This morning and last Friday   4. ONSET: \"When was the blood first seen in the stools?\" (Days or weeks)       Last friday  5. DIARRHEA: \"Is there also some diarrhea?\" If Yes, ask: \"How many diarrhea stools in the past 24 hours?\"       Friday diarrhea but today more formed with blood   6. CONSTIPATION: \"Do you have constipation?\" If Yes, ask: \"How bad is it?\"      Yes hx of constipation and took MOM a week ago --has hemorrhoids   7. RECURRENT SYMPTOMS: \"Have you had blood in your stools before?\" If Yes, ask: \"When was the last time?\" and \"What happened that time?\"       no  8. BLOOD THINNERS: \"Do you take any blood thinners?\" (e.g., Coumadin/warfarin, Pradaxa/dabigatran, aspirin)      no  9. OTHER SYMPTOMS: \"Do you have any other symptoms?\"  (e.g., abdomen pain, vomiting, dizziness, fever)      Abdominal pain left side mainly, dull achy   10. PREGNANCY: \"Is there any chance you are pregnant?\" \"When was your last menstrual period?\"         Yes but she doesn tthink so    Protocols used: Rectal Bleeding-ADULT-AH    "

## 2024-08-27 ENCOUNTER — OFFICE VISIT (OUTPATIENT)
Dept: FAMILY MEDICINE CLINIC | Facility: CLINIC | Age: 34
End: 2024-08-27
Payer: OTHER GOVERNMENT

## 2024-08-27 VITALS
SYSTOLIC BLOOD PRESSURE: 112 MMHG | HEIGHT: 67 IN | WEIGHT: 184 LBS | HEART RATE: 68 BPM | TEMPERATURE: 98.4 F | BODY MASS INDEX: 28.88 KG/M2 | DIASTOLIC BLOOD PRESSURE: 70 MMHG | RESPIRATION RATE: 16 BRPM

## 2024-08-27 DIAGNOSIS — K92.2 LOWER GI BLEEDING: ICD-10-CM

## 2024-08-27 DIAGNOSIS — Z80.0 FAMILY HISTORY OF COLON CANCER: ICD-10-CM

## 2024-08-27 PROCEDURE — 99213 OFFICE O/P EST LOW 20 MIN: CPT | Performed by: FAMILY MEDICINE

## 2024-08-27 NOTE — PROGRESS NOTES
"Chief Complaint   Patient presents with    ER follow up      [facility], blood in stool, talk about vits        Subjective      Renetta Garrido is a 34 y.o. who presents for follow-up of an ER visit where an patient presented shortly after passing some dark stools with blood.  This happened after an episode of running as she is training for half marathon.  However patient's concern was raised as there is a family history of colon cancer with her father being diagnosed at age 51.  Her appetite is normal.  Bowel habits are irregular but unchanged.  Since the ER visit 2 weeks ago she has had an additional episode of dark and blood in the stool.  She has a history of hemorrhoids with pregnancy.  In the ER evaluation was completed and did not show any anemia or intra-abdominal mass via CT scanning.    The following portions of the patient's history were reviewed and updated as appropriate: allergies, current medications, past family history, past medical history, past social history, past surgical history, and problem list.    Review of Systems    Objective   Vital Signs:  /70   Pulse 68   Temp 98.4 °F (36.9 °C)   Resp 16   Ht 170.2 cm (67\")   Wt 83.5 kg (184 lb)   BMI 28.82 kg/m²               Physical Exam  Vitals reviewed.   Constitutional:       Appearance: Normal appearance.   Abdominal:      General: Abdomen is flat. Bowel sounds are normal. There is no distension.      Palpations: Abdomen is soft. There is no mass.      Tenderness: There is no abdominal tenderness. There is no guarding.   Neurological:      Mental Status: She is alert.          Result Review                     Assessment and Plan  Diagnoses and all orders for this visit:    1. Lower GI bleeding  -     Ambulatory Referral to Gastroenterology    2. Family history of colon cancer  -     Ambulatory Referral to Gastroenterology    Plan: Patient will be referred to local GI due to her recurrent episodes of melena and family history of colon " cancer.  She is not describing hemorrhoidal bleeding.        Follow Up  No follow-ups on file.  Patient was given instructions and counseling regarding her condition or for health maintenance advice. Please see specific information pulled into the AVS if appropriate.

## 2024-08-28 ENCOUNTER — TELEPHONE (OUTPATIENT)
Dept: FAMILY MEDICINE CLINIC | Facility: CLINIC | Age: 34
End: 2024-08-28
Payer: OTHER GOVERNMENT

## 2024-08-28 NOTE — TELEPHONE ENCOUNTER
Caller: Renetta Garrido    Relationship to patient: Self    Best call back number:     [phone]        Chief complaint: SHARP PAIN ON LOWER LEFT SIDE    Patient directed to call 911 or go to their nearest emergency room.     Patient verbalized understanding: [x] Yes  [] No  If no, why?

## 2024-10-09 ENCOUNTER — OFFICE VISIT (OUTPATIENT)
Dept: OBSTETRICS AND GYNECOLOGY | Facility: CLINIC | Age: 34
End: 2024-10-09
Payer: OTHER GOVERNMENT

## 2024-10-09 VITALS
SYSTOLIC BLOOD PRESSURE: 110 MMHG | WEIGHT: 182 LBS | DIASTOLIC BLOOD PRESSURE: 72 MMHG | HEIGHT: 67 IN | BODY MASS INDEX: 28.56 KG/M2

## 2024-10-09 DIAGNOSIS — O26.899 RH NEGATIVE, ANTEPARTUM: ICD-10-CM

## 2024-10-09 DIAGNOSIS — Z67.91 RH NEGATIVE, ANTEPARTUM: ICD-10-CM

## 2024-10-09 DIAGNOSIS — Z31.69 PRE-CONCEPTION COUNSELING: ICD-10-CM

## 2024-10-09 DIAGNOSIS — B00.9 HSV INFECTION: Primary | ICD-10-CM

## 2024-10-09 NOTE — PROGRESS NOTES
Chief Complaint   Patient presents with    Contraception       Subjective   HPI  Renetta Garrido is a 34 y.o. female, , LMP was on Patient's last menstrual period was 2024 (exact date). who presents for preconceptual counseling. The patients  had a vasectomy 3 years ago and is having a reversal in [place] in 2024. She has a history of infertility but when the septum was found and removed she got pregnant immediatley back to back .     Her periods are regular every 28-30 days, lasting 5 days.  Dysmenorrhea:none. The patient reports additional symptoms as none.  She is considering to conceive in  timeframe and her current method of contraception is contraceptive methods: Vasectomy . . Her past medical history is noted for: uterine septum removal She has not had a previous workup for infertility. Partner Status: Marital Status: . Her partner has fathered children. His past medical history is notable for no medical issues..    Current Outpatient Medications on File Prior to Visit   Medication Sig Dispense Refill    valACYclovir (Valtrex) 1000 MG tablet Take 1 tablet by mouth Daily. 30 tablet 12     No current facility-administered medications on file prior to visit.        Additional OB/GYN History   Last Pap :   Last Completed Pap Smear            PAP SMEAR (Every 3 Years) Next due on 2/15/2027      02/15/2024  LIQUID-BASED PAP SMEAR WITH HPV GENOTYPING REGARDLESS OF INTERPRETATION (DEZ,COR,MAD)                  History of abnormal Pap smear: no  Exercises Regularly: yes  Feelings of Anxiety or Depression: no  Tobacco Usage?: No   OB History          6    Para   3    Term                AB   3    Living   3         SAB        IAB        Ectopic        Molar        Multiple        Live Births                      Current Outpatient Medications:     valACYclovir (Valtrex) 1000 MG tablet, Take 1 tablet by mouth Daily., Disp: 30 tablet, Rfl: 12     Past  "Medical History:   Diagnosis Date    Abnormal Pap smear of cervix     ADHD (attention deficit hyperactivity disorder) 2008    Haven’t taken meds for it since 2016    Anemia     Had to be put on iron in college and then slow fe during pregnancies    Ankle sprain     Have one currently and several before this    Female infertility     Herpes     Hsv1    Low back strain     Multiple gestation     Ovarian cyst     Wrist sprain         Past Surgical History:   Procedure Laterality Date    HYSTEROSCOPY  05/2018    hysteroscopic metroplasty Semi septate uterus    UTERINE SEPTUM REMOVAL  2017    semi septate       The additional following portions of the patient's history were reviewed and updated as appropriate: allergies, current medications, past family history, past medical history, past social history, past surgical history, and problem list.    Review of Systems   All other systems reviewed and are negative.      I have reviewed and agree with the HPI, ROS, and historical information as entered above. Osiris Rubi MD      Objective   /72   Ht 170.2 cm (67\")   Wt 82.6 kg (182 lb)   LMP 09/22/2024 (Exact Date)   BMI 28.51 kg/m²     Physical Exam  Physical Exam:  General:  well developed; well nourished  no acute distress  mentation appropriate   Abdomen: soft, non-tender; no masses  no umbilical or inguinal hernias are present   Pelvis: Not performed.      Assessment & Plan     Assessment and Plan    Problem List Items Addressed This Visit       HSV infection - Primary    Overview     On suppression as of today 2/15/24         Relevant Medications    valACYclovir (Valtrex) 1000 MG tablet    Pre-conception counseling    Rh negative, antepartum       Ovulatory cycles discussed with the patient.  She understands the concept of timed intercourse.  We also discussed the importance of prenatal vitamins and folic acid.  Reviewed that only 30% of people get pregnant by 3 months, 50% x 6 months, and 90% by year.  " Should the patient attempt to conceive for greater than 1 year she should return to the clinic for evaluation.  Reassurance.  Advised patient to call with a positive urine pregnancy test.  Return PRN.  Reviewed Rh neg precautions  Reviewed risks of aneuploidy with increasing age and risk increased for other things such as GDM or preeclampsia.  Patient voiced understanding.  Would plan vagina progesterone with +UPT.  Also, patient states she has taken bASA before in early pregnancy b/c of h/o mult miscarriages but she has never had bloodwork for APL Ab syndrome.  Return if symptoms worsen or fail to improve.      Osiris Rubi MD  10/09/2024

## 2024-10-25 ENCOUNTER — FLU SHOT (OUTPATIENT)
Dept: FAMILY MEDICINE CLINIC | Facility: CLINIC | Age: 34
End: 2024-10-25
Payer: OTHER GOVERNMENT

## 2024-10-25 DIAGNOSIS — Z23 NEED FOR INFLUENZA VACCINATION: Primary | ICD-10-CM

## 2024-10-25 PROCEDURE — 90471 IMMUNIZATION ADMIN: CPT | Performed by: FAMILY MEDICINE

## 2024-10-25 PROCEDURE — 90656 IIV3 VACC NO PRSV 0.5 ML IM: CPT | Performed by: FAMILY MEDICINE

## 2025-01-13 ENCOUNTER — LAB (OUTPATIENT)
Dept: OBSTETRICS AND GYNECOLOGY | Facility: CLINIC | Age: 35
End: 2025-01-13
Payer: OTHER GOVERNMENT

## 2025-01-13 ENCOUNTER — TELEPHONE (OUTPATIENT)
Dept: FAMILY MEDICINE CLINIC | Facility: CLINIC | Age: 35
End: 2025-01-13
Payer: OTHER GOVERNMENT

## 2025-01-13 DIAGNOSIS — Z34.90 PREGNANCY, UNSPECIFIED GESTATIONAL AGE: Primary | ICD-10-CM

## 2025-01-13 DIAGNOSIS — Z32.00 UNCONFIRMED PREGNANCY: Primary | ICD-10-CM

## 2025-01-13 NOTE — TELEPHONE ENCOUNTER
PATIENT CALLING TO SCHEDULE NEW OB APPT LMP 12/09/2024  SCHEDULED PATIENT FOR 1/31/25 WITH JO IN [facility] OFFICE    PT STATES DR GONZALEZ TOLD HER TO CALL ONCE SHE FINDS OUT SHE WAS PREGNANT SO SHE CAN BE PUT ON PROGESTERONE

## 2025-01-13 NOTE — TELEPHONE ENCOUNTER
Caller: Renetta Garrido    Relationship: Self    Best call back number:     [phone] (Mobile)     What is the medical concern/diagnosis:     PREGNANCY    What specialty or service is being requested:     OBGYN     What is the provider, practice or medical service name:     DR KAPIL GONZALEZ    What is the office location:     [facility]    What is the office phone number:     [phone]    Any additional details:     PLEASE CONTACT PATIENT WITH ANY UPDATES FOR REFERRAL REQUEST

## 2025-01-14 ENCOUNTER — TELEPHONE (OUTPATIENT)
Dept: OBSTETRICS AND GYNECOLOGY | Facility: CLINIC | Age: 35
End: 2025-01-14
Payer: OTHER GOVERNMENT

## 2025-01-14 DIAGNOSIS — Z32.01 POSITIVE URINE PREGNANCY TEST: Primary | ICD-10-CM

## 2025-01-14 LAB
ABO GROUP BLD: NORMAL
HCG INTACT+B SERPL-ACNC: 17 MIU/ML
PROGEST SERPL-MCNC: 8.9 NG/ML
RH BLD: NEGATIVE

## 2025-01-14 RX ORDER — PROGESTERONE 200 MG/1
200 CAPSULE ORAL DAILY
Qty: 30 CAPSULE | Refills: 2 | Status: SHIPPED | OUTPATIENT
Start: 2025-01-14

## 2025-01-14 NOTE — TELEPHONE ENCOUNTER
I'm sorry.  I was on call the weekend and just now seeing, but I sent Rx and repeat HCG tomorrow Self Exam: Abdomen soft, non-tender to palpatation, non-distended

## 2025-01-14 NOTE — TELEPHONE ENCOUNTER
Spoke with patient to let her know progesterone Rx was called in. Patient also v/u to get repeat HCG levels tested tomorrow.

## 2025-01-14 NOTE — TELEPHONE ENCOUNTER
Please let patient know I sent progesterone to pharmacy on chart.  Also, I put in repeat HCG for tomorrow.  Thanks!!

## 2025-01-14 NOTE — TELEPHONE ENCOUNTER
Patient of Dr. Rubi  Spoke with patient who got a faint positive UPT. Patient went to [place] yesterday for HCG, progesterone, and ABO labs.   Patients LMP - 12/9/24 and first UPT on 1/11/24  Patient was last seen in office in October and was told to contact us when she got a positive UPT so she could be prescribed progesterone. Patient has hx of multiple miscarriages and is very nervous to lose this pregnancy. She has seen her lab values from yesterday and is concerned that they are low.

## 2025-01-14 NOTE — TELEPHONE ENCOUNTER
Pt seen in [place] and called yesterday to request that she receive progesterone as directed by Dr. Rubi once pregnant. Pt has also sent a message for the same. Pt calling again as she has not received a call back.

## 2025-01-15 ENCOUNTER — LAB (OUTPATIENT)
Dept: OBSTETRICS AND GYNECOLOGY | Facility: CLINIC | Age: 35
End: 2025-01-15
Payer: OTHER GOVERNMENT

## 2025-01-16 ENCOUNTER — TELEPHONE (OUTPATIENT)
Dept: OBSTETRICS AND GYNECOLOGY | Facility: CLINIC | Age: 35
End: 2025-01-16
Payer: OTHER GOVERNMENT

## 2025-01-16 DIAGNOSIS — O09.291 CURRENT PREGNANCY IN FIRST TRIMESTER WITH HISTORY OF SPONTANEOUS ABORTION DURING PRIOR PREGNANCY: Primary | ICD-10-CM

## 2025-01-16 NOTE — TELEPHONE ENCOUNTER
Called patient let her know that her hcg had risen but she needs to repeat it tomorrow 1/17 and Sunday 1/19 to follow to rule out ectopic. CAMILA

## 2025-01-16 NOTE — TELEPHONE ENCOUNTER
PT CALLED AND STATED THAT SHE IS FEELING VERY UNEASY DUE TO THE LAST CALL SHE GOT FROM THE OFFICE, PT DOES NOT UNDERSTAND IF WE THINK SHE IS INDEED HAVING A ECTOPIC PREGNANCY OR IF WE ARE JUST RULING IT OUT TO BE SURE. SHE IS VERY WORRIED AND WOULD LIKE TO DISCUSS

## 2025-01-16 NOTE — TELEPHONE ENCOUNTER
"Returned patient's call.   She is very concerned because \"rule out ectopic\" was mentioned by APRN in phone call earlier today.   She is asking if we think she has an ectopic pregnancy.  Discussed that it is standard of care to follow the low HCG level to ensure it is rising appropriately.  Reviewed ectopic precautions. Advised to call for problems. Advised to go to ER for heavy bleeding, passing clots golf ball size or larger, or severe pain, especially if localized to one side.  She has already been instructed to go to our [facility] office tomorrow for HCG and to come to [facility] lab 01/19/25 for another HCG. Orders placed.  She v/u of all discussed and agreed.   "

## 2025-01-16 NOTE — TELEPHONE ENCOUNTER
Caller: PAOLA LINN    Relationship: SELF    Best call back number: [phone] (home)       Caller requesting test results: LABS    What test was performed: HCG    When was the test performed: 1/15/25    Where was the test performed: OFFICE    Additional notes: [facility] AND WANTS TO KNOW IF NEEDS ANOTHER LEVEL CHECK? PLEASE ADVISE.

## 2025-01-17 ENCOUNTER — LAB (OUTPATIENT)
Dept: OBSTETRICS AND GYNECOLOGY | Facility: CLINIC | Age: 35
End: 2025-01-17
Payer: OTHER GOVERNMENT

## 2025-01-17 DIAGNOSIS — O09.291 CURRENT PREGNANCY IN FIRST TRIMESTER WITH HISTORY OF SPONTANEOUS ABORTION DURING PRIOR PREGNANCY: Primary | ICD-10-CM

## 2025-01-18 LAB — HCG INTACT+B SERPL-ACNC: 107 MIU/ML

## 2025-01-19 NOTE — PROGRESS NOTES
Looks like increasing appropriately.  I think she had planned to draw once more ans has new ob appt scheduled

## 2025-01-20 ENCOUNTER — LAB (OUTPATIENT)
Dept: OBSTETRICS AND GYNECOLOGY | Facility: CLINIC | Age: 35
End: 2025-01-20
Payer: OTHER GOVERNMENT

## 2025-01-20 DIAGNOSIS — O20.0 THREATENED ABORTION: Primary | ICD-10-CM

## 2025-01-20 LAB — HCG INTACT+B SERPL-ACNC: 483 MIU/ML

## 2025-01-22 ENCOUNTER — LAB (OUTPATIENT)
Dept: OBSTETRICS AND GYNECOLOGY | Facility: CLINIC | Age: 35
End: 2025-01-22
Payer: OTHER GOVERNMENT

## 2025-01-22 DIAGNOSIS — O20.0 THREATENED ABORTION: Primary | ICD-10-CM

## 2025-01-23 ENCOUNTER — TELEPHONE (OUTPATIENT)
Dept: OBSTETRICS AND GYNECOLOGY | Facility: CLINIC | Age: 35
End: 2025-01-23
Payer: OTHER GOVERNMENT

## 2025-01-23 LAB — HCG INTACT+B SERPL-ACNC: NORMAL MIU/ML

## 2025-01-23 NOTE — TELEPHONE ENCOUNTER
Patient called in regards to her HCG level yesterday and wants to know if she needs to have the level repeated. She states her previous OB/GYN would check it every 48 hours over the first three weeks.  Her HCG has risen from 483 on 1/20/25 to 1,010 on 1/22/25. Advised patient that her HCG has risen appropriately. Advised she could have her HCG checked one more time tomorrow if she would like. Katheryn MARTÍNEZ.      HENRIETTA Davies

## 2025-01-24 ENCOUNTER — LAB (OUTPATIENT)
Dept: OBSTETRICS AND GYNECOLOGY | Facility: CLINIC | Age: 35
End: 2025-01-24
Payer: OTHER GOVERNMENT

## 2025-01-24 DIAGNOSIS — O20.0 THREATENED ABORTION: Primary | ICD-10-CM

## 2025-01-24 LAB — HCG INTACT+B SERPL-ACNC: NORMAL MIU/ML

## 2025-01-27 ENCOUNTER — LAB (OUTPATIENT)
Dept: OBSTETRICS AND GYNECOLOGY | Facility: CLINIC | Age: 35
End: 2025-01-27
Payer: OTHER GOVERNMENT

## 2025-01-27 DIAGNOSIS — O20.0 THREATENED ABORTION: Primary | ICD-10-CM

## 2025-01-28 LAB — HCG INTACT+B SERPL-ACNC: NORMAL MIU/ML

## 2025-01-30 ENCOUNTER — OFFICE VISIT (OUTPATIENT)
Dept: FAMILY MEDICINE CLINIC | Facility: CLINIC | Age: 35
End: 2025-01-30
Payer: OTHER GOVERNMENT

## 2025-01-30 VITALS
HEIGHT: 67 IN | BODY MASS INDEX: 30.32 KG/M2 | DIASTOLIC BLOOD PRESSURE: 70 MMHG | SYSTOLIC BLOOD PRESSURE: 115 MMHG | HEART RATE: 64 BPM | TEMPERATURE: 97.3 F | RESPIRATION RATE: 20 BRPM | OXYGEN SATURATION: 98 % | WEIGHT: 193.2 LBS

## 2025-01-30 DIAGNOSIS — M72.2 PLANTAR FASCIITIS OF LEFT FOOT: Primary | ICD-10-CM

## 2025-01-30 PROCEDURE — 99213 OFFICE O/P EST LOW 20 MIN: CPT | Performed by: FAMILY MEDICINE

## 2025-01-30 RX ORDER — ASPIRIN 81 MG/1
81 TABLET ORAL DAILY
COMMUNITY

## 2025-01-30 NOTE — PROGRESS NOTES
"Chief Complaint   Patient presents with    L heel pain      Since October (FYI: pt currently found out she is pregnant)        Subjective      Renetta Garrido is a 34 y.o. who presents for left heel pain has been present for a couple of months and she suspects plantar fasciitis.  Symptoms have slowly improved but still persist.  She believes the impact of her heel pain causing affected gait is also contributing to some hip pain as well as some back pain.  Patient is pregnant and wants to make sure no interventions are necessary at the moment    Objective   Vital Signs:  /70   Pulse 64   Temp 97.3 °F (36.3 °C)   Resp 20   Ht 170.2 cm (67\")   Wt 87.6 kg (193 lb 3.2 oz)   SpO2 98%   BMI 30.26 kg/m²     Physical Exam  Vitals reviewed.   Musculoskeletal:      Lumbar back: Normal.      Left hip: Normal.      Right foot: Decreased range of motion.      Left foot: Decreased range of motion. Tenderness present.      Comments: Patient has decreased passive plantarflexion to about 90 degrees.  Tenderness of the medial heel on the left   Neurological:      Mental Status: She is alert.          Result Review                     Assessment and Plan  Diagnoses and all orders for this visit:    1. Plantar fasciitis of left foot (Primary)  Comments:  Improving.  Recommended continued stretching exercises and massage.  Appropriate footwear.            Follow Up  No follow-ups on file.  Patient was given instructions and counseling regarding her condition or for health maintenance advice. Please see specific information pulled into the AVS if appropriate.       "

## 2025-01-31 ENCOUNTER — INITIAL PRENATAL (OUTPATIENT)
Dept: OBSTETRICS AND GYNECOLOGY | Facility: CLINIC | Age: 35
End: 2025-01-31
Payer: OTHER GOVERNMENT

## 2025-01-31 VITALS — SYSTOLIC BLOOD PRESSURE: 104 MMHG | WEIGHT: 194.4 LBS | DIASTOLIC BLOOD PRESSURE: 72 MMHG | BODY MASS INDEX: 30.45 KG/M2

## 2025-01-31 DIAGNOSIS — O26.899 RH NEGATIVE, ANTEPARTUM: ICD-10-CM

## 2025-01-31 DIAGNOSIS — Z67.91 RH NEGATIVE, ANTEPARTUM: ICD-10-CM

## 2025-01-31 DIAGNOSIS — Z34.81 MULTIGRAVIDA IN FIRST TRIMESTER: Primary | ICD-10-CM

## 2025-01-31 DIAGNOSIS — B00.9 HSV INFECTION: ICD-10-CM

## 2025-01-31 PROBLEM — Z31.69 PRE-CONCEPTION COUNSELING: Status: RESOLVED | Noted: 2024-10-09 | Resolved: 2025-01-31

## 2025-01-31 PROBLEM — Z01.419 PAP TEST, AS PART OF ROUTINE GYNECOLOGICAL EXAMINATION: Status: RESOLVED | Noted: 2024-02-15 | Resolved: 2025-01-31

## 2025-01-31 RX ORDER — PROGESTERONE 200 MG/1
200 CAPSULE ORAL DAILY
Qty: 30 CAPSULE | Refills: 2 | Status: SHIPPED | OUTPATIENT
Start: 2025-01-31

## 2025-01-31 NOTE — PROGRESS NOTES
Initial ob visit     CC- Here for care of pregnancy        Renetta Garrido is a 34 y.o. female, , who presents for her first obstetrical visit.  Patient's last menstrual period was 2024.. Her WENDY is 2025, by Ultrasound. Current GA is 6w1d.     Initial positive test date : 01/15/2025, UPT and HCG        Her periods are usually every 28 days days.  Prior obstetric issues: none  Patient's past medical history is significant for:  None .  Family history of genetic issues (includes FOB): None  Prior infections concerning in pregnancy (Rash, fever in last 2 weeks): No  Varicella Hx - history of chicken pox  Prior testing for Cystic Fibrosis Carrier or Sickle Cell Trait- No  Prepregnancy BMI - Body mass index is 30.45 kg/m².  History of STD: no  Hx of HSV for patient or partner: yes - HSV1  MOB and FOB  Ultrasound Today: yes    OB History    Para Term  AB Living   7 3     3 3   SAB IAB Ectopic Molar Multiple Live Births                    # Outcome Date GA Lbr Mitul/2nd Weight Sex Type Anes PTL Lv   7 Current            6 Para            5 Para            4 Para            3 AB            2 AB            1 AB                Additional Pertinent History   Last Pap : 02/15/2024 Result: ASCUS HPV: negative     Last Completed Pap Smear            PAP SMEAR (Every 3 Years) Next due on 2/15/2027      02/15/2024  LIQUID-BASED PAP SMEAR WITH HPV GENOTYPING REGARDLESS OF INTERPRETATION (DEZ,COR,MAD)                  History of abnormal Pap smear: yes - 02/15/2024 ASCUS, HPV -  Family history of uterine, colon, breast, or ovarian cancer: yes - Father had colon cancer  Patient had colonoscopy 2024. Wnl  Feelings of Anxiety or Depression: no  Tobacco Usage?: No   Alcohol/Drug Use?: YES Alcohol: 2024 , No Drug use  Over the age of 35 at delivery: yes  Genetic Screening: desires cell free DNA with gender  Flu Status: Already given in current flu season    PMH    Current Outpatient  Medications:     aspirin 81 MG EC tablet, Take 1 tablet by mouth Daily., Disp: , Rfl:     Progesterone (Prometrium) 200 MG capsule, Insert 1 capsule into the vagina Daily., Disp: 30 capsule, Rfl: 2    valACYclovir (Valtrex) 1000 MG tablet, Take 1 tablet by mouth Daily., Disp: 30 tablet, Rfl: 12     Past Medical History:   Diagnosis Date    Abnormal Pap smear of cervix     ADHD (attention deficit hyperactivity disorder) 2008    Haven’t taken meds for it since 2016    Anemia     Had to be put on iron in college and then slow fe during pregnancies    Ankle sprain     Have one currently and several before this    Female infertility     Herpes     Hsv1    Low back strain     Multiple gestation     Ovarian cyst     Wrist sprain         Past Surgical History:   Procedure Laterality Date    COLONOSCOPY  09/2024    HYSTEROSCOPY  05/2018    hysteroscopic metroplasty Semi septate uterus    UTERINE SEPTUM REMOVAL  2017    semi septate       Review of Systems   Review of Systems    Patient Reports:  No complaints  Patient Denies:excessive nausea , excessive vomiting, and vaginal bleeding  All systems reviewed and otherwise normal.    I have reviewed and agree with the HPI, ROS, and historical information as entered above. Osiris Rubi MD      /72   Wt 88.2 kg (194 lb 6.4 oz)   LMP 12/09/2024   BMI 30.45 kg/m²     The additional following portions of the patient's history were reviewed and updated as appropriate: allergies, current medications, past family history, past medical history, past social history, past surgical history, and problem list.    Physical Exam  General:  well developed; well nourished  no acute distress  mentation appropriate   Chest/Respiratory: No labored breathing, normal respiratory effort, normal appearance, no respiratory noises noted   Heart:  not examined   Thyroid: normal to inspection and palpation   Breasts:  Not performed.   Abdomen: soft, non-tender; no masses  no umbilical or  inguinal hernias are present  no hepato-splenomegaly   Pelvis: Not performed.        Assessment and Plan    Problem List Items Addressed This Visit       HSV infection    Overview     On suppression as of today 2/15/24         Relevant Medications    valACYclovir (Valtrex) 1000 MG tablet    Rh negative, antepartum    Multigravida in first trimester - Primary    Relevant Orders    Obstetric Panel    HIV-1 / O / 2 Ag / Antibody    Urine Culture - Urine, Urine, Clean Catch    Urinalysis With Microscopic - Urine, Clean Catch    Chlamydia trachomatis, Neisseria gonorrhoeae, PCR - Urine, Urine, Clean Catch    Urine Drug Screen - Urine, Clean Catch       Pregnancy at 6w1d  Reviewed routine prenatal care with the office and educational materials given  Lab(s) Ordered  Discussed options for genetic testing including first trimester nuchal translucency screen, genetic disease carrier testing, quadruple screen, and NIPT  Discontinue the use of all non-medicinal drugs and chemicals  Nausea/Vomiting - she does not desire medications at this time.  Discussed conservative ways to help with nausea.  Patient is on Prenatal vitamins  Activity recommendation : 150 minutes/week of moderate intensity aerobic activity unless we limit for bleeding, hypertension or other pregnancy complication   Return in about 1 week (around 2/7/2025) for  1-2wk, WITH SONO.      Osiris Rubi MD  01/31/2025

## 2025-02-02 LAB
BACTERIA #/AREA URNS HPF: NORMAL /[HPF]
CASTS URNS QL MICRO: NORMAL /LPF
EPI CELLS #/AREA URNS HPF: NORMAL /HPF (ref 0–10)
MUCOUS THREADS URNS QL MICRO: PRESENT
RBC #/AREA URNS HPF: NORMAL /HPF (ref 0–2)
WBC #/AREA URNS HPF: NORMAL /HPF (ref 0–5)

## 2025-02-03 LAB
REQUEST PROBLEM: NORMAL
SPECIMEN STATUS: NORMAL

## 2025-02-04 LAB
ABO GROUP BLD: ABNORMAL
AMPHETAMINES UR QL SCN: NEGATIVE NG/ML
APPEARANCE UR: CLEAR
BACTERIA #/AREA URNS HPF: NORMAL /[HPF]
BACTERIA UR CULT: NORMAL
BACTERIA UR CULT: NORMAL
BARBITURATES UR QL SCN: NEGATIVE NG/ML
BASOPHILS # BLD AUTO: 0 X10E3/UL (ref 0–0.2)
BASOPHILS NFR BLD AUTO: 0 %
BENZODIAZ UR QL SCN: NEGATIVE NG/ML
BILIRUB UR QL STRIP: NEGATIVE
BLD GP AB SCN SERPL QL: NEGATIVE
BZE UR QL SCN: NEGATIVE NG/ML
C TRACH RRNA SPEC QL NAA+PROBE: NEGATIVE
CANNABINOIDS UR QL SCN: NEGATIVE NG/ML
CASTS URNS QL MICRO: NORMAL /LPF
COLOR UR: YELLOW
CREAT UR-MCNC: 105.1 MG/DL (ref 20–300)
EOSINOPHIL # BLD AUTO: 0 X10E3/UL (ref 0–0.4)
EOSINOPHIL NFR BLD AUTO: 1 %
EPI CELLS #/AREA URNS HPF: NORMAL /HPF (ref 0–10)
ERYTHROCYTE [DISTWIDTH] IN BLOOD BY AUTOMATED COUNT: 11.8 % (ref 11.7–15.4)
GLUCOSE UR QL STRIP: NEGATIVE
HBV SURFACE AG SERPL QL IA: NEGATIVE
HCT VFR BLD AUTO: 40.5 % (ref 34–46.6)
HCV IGG SERPL QL IA: NON REACTIVE
HGB BLD-MCNC: 13.6 G/DL (ref 11.1–15.9)
HGB UR QL STRIP: NEGATIVE
HIV 1+2 AB+HIV1 P24 AG SERPL QL IA: NON REACTIVE
IMM GRANULOCYTES # BLD AUTO: 0 X10E3/UL (ref 0–0.1)
IMM GRANULOCYTES NFR BLD AUTO: 0 %
KETONES UR QL STRIP: NEGATIVE
LABORATORY COMMENT REPORT: NORMAL
LEUKOCYTE ESTERASE UR QL STRIP: NEGATIVE
LYMPHOCYTES # BLD AUTO: 1.6 X10E3/UL (ref 0.7–3.1)
LYMPHOCYTES NFR BLD AUTO: 33 %
MCH RBC QN AUTO: 32.5 PG (ref 26.6–33)
MCHC RBC AUTO-ENTMCNC: 33.6 G/DL (ref 31.5–35.7)
MCV RBC AUTO: 97 FL (ref 79–97)
METHADONE UR QL SCN: NEGATIVE NG/ML
MICRO URNS: ABNORMAL
MICRO URNS: ABNORMAL
MONOCYTES # BLD AUTO: 0.4 X10E3/UL (ref 0.1–0.9)
MONOCYTES NFR BLD AUTO: 7 %
MUCOUS THREADS URNS QL MICRO: PRESENT
N GONORRHOEA RRNA SPEC QL NAA+PROBE: NEGATIVE
NEUTROPHILS # BLD AUTO: 2.9 X10E3/UL (ref 1.4–7)
NEUTROPHILS NFR BLD AUTO: 59 %
NITRITE UR QL STRIP: NEGATIVE
OPIATES UR QL SCN: NEGATIVE NG/ML
OXYCODONE+OXYMORPHONE UR QL SCN: NEGATIVE NG/ML
PCP UR QL: NEGATIVE NG/ML
PH UR STRIP: 8 [PH] (ref 5–7.5)
PH UR: 7.6 [PH] (ref 4.5–8.9)
PLATELET # BLD AUTO: 214 X10E3/UL (ref 150–450)
PROPOXYPH UR QL SCN: NEGATIVE NG/ML
PROT UR QL STRIP: NEGATIVE
RBC # BLD AUTO: 4.18 X10E6/UL (ref 3.77–5.28)
RBC #/AREA URNS HPF: NORMAL /HPF (ref 0–2)
RH BLD: NEGATIVE
RPR SER QL: NON REACTIVE
RUBV IGG SERPL IA-ACNC: <0.9 INDEX
SP GR UR STRIP: 1.02 (ref 1–1.03)
UROBILINOGEN UR STRIP-MCNC: 1 MG/DL (ref 0.2–1)
WBC # BLD AUTO: 4.9 X10E3/UL (ref 3.4–10.8)
WBC #/AREA URNS HPF: NORMAL /HPF (ref 0–5)

## 2025-02-14 ENCOUNTER — ROUTINE PRENATAL (OUTPATIENT)
Dept: OBSTETRICS AND GYNECOLOGY | Facility: CLINIC | Age: 35
End: 2025-02-14
Payer: OTHER GOVERNMENT

## 2025-02-14 VITALS — WEIGHT: 193.2 LBS | DIASTOLIC BLOOD PRESSURE: 78 MMHG | SYSTOLIC BLOOD PRESSURE: 104 MMHG | BODY MASS INDEX: 30.26 KG/M2

## 2025-02-14 DIAGNOSIS — Z34.81 MULTIGRAVIDA IN FIRST TRIMESTER: Primary | ICD-10-CM

## 2025-02-14 LAB
GLUCOSE UR STRIP-MCNC: NEGATIVE MG/DL
PROT UR STRIP-MCNC: NEGATIVE MG/DL

## 2025-02-14 NOTE — PROGRESS NOTES
OB FOLLOW UP  CC- Here for care of pregnancy        Renetta Garrido is a 34 y.o.  8w1d patient being seen today for her obstetrical follow up visit. Patient reports having constipation and fatigue. Patient states she is taking Miralax for the constipation.    Her prenatal care is complicated by (and status) :   Patient Active Problem List   Diagnosis    Closed displaced fracture of head of right radius    Injury of triangular fibrocartilage complex of right wrist    Sprain of anterior talofibular ligament of left ankle    HSV infection    Rh negative, antepartum    Multigravida in first trimester       Genetic testing?: desires with gender.  NOB labs reviewed  Flu Status: Already given in current flu season  Ultrasound Today: Yes    ROS -   Patient Denies: leaking of fluid, vaginal bleeding, dysuria, excessive vomiting, and more than 6 contractions per hour   Other than what is documented in the HPI, all other systems reviewed and are negative.     The additional following portions of the patient's history were reviewed and updated as appropriate: allergies, current medications, past family history, past medical history, past social history, past surgical history, and problem list.    I have reviewed and agree with the HPI, ROS, and historical information as entered above. Radha Burks, APRN          /78   Wt 87.6 kg (193 lb 3.2 oz)   LMP 2024   BMI 30.26 kg/m²         EXAM:     Prenatal Vitals  BP: 104/78  Weight: 87.6 kg (193 lb 3.2 oz)   Fetal Heart Rate: 167          Urine Glucose Read-only: Negative  Urine Protein Read-only: Negative       Assessment and Plan    Problem List Items Addressed This Visit          Gravid and     Multigravida in first trimester - Primary    Relevant Orders    POC Urinalysis Dipstick (Completed)       Pregnancy at 8w1d  Labs reviewed from New OB Visit.  Counseled on genetic testing, carrier status and option for NT screen  Activity and  Exercise discussed.  Patient is on Prenatal vitamins  Return in about 4 weeks (around 3/14/2025) for SHAYNA JO.    Radha Burks, APRN  02/14/2025

## 2025-03-05 RX ORDER — VALACYCLOVIR HYDROCHLORIDE 1 G/1
1000 TABLET, FILM COATED ORAL DAILY
Qty: 30 TABLET | Refills: 12 | Status: SHIPPED | OUTPATIENT
Start: 2025-03-05

## 2025-03-14 ENCOUNTER — ROUTINE PRENATAL (OUTPATIENT)
Dept: OBSTETRICS AND GYNECOLOGY | Facility: CLINIC | Age: 35
End: 2025-03-14
Payer: OTHER GOVERNMENT

## 2025-03-14 VITALS — BODY MASS INDEX: 30.38 KG/M2 | WEIGHT: 194 LBS | DIASTOLIC BLOOD PRESSURE: 70 MMHG | SYSTOLIC BLOOD PRESSURE: 108 MMHG

## 2025-03-14 DIAGNOSIS — Z3A.12 12 WEEKS GESTATION OF PREGNANCY: Primary | ICD-10-CM

## 2025-03-14 LAB
GLUCOSE UR STRIP-MCNC: NEGATIVE MG/DL
PROT UR STRIP-MCNC: NEGATIVE MG/DL

## 2025-03-14 NOTE — PROGRESS NOTES
OB FOLLOW UP  CC- Here for care of pregnancy        Renetta Garrido is a 35 y.o.  12w1d patient being seen today for her obstetrical follow up visit. Patient reports RLQ tenderness/cramping.  Denies bleeding/abnl discharge.  Also concerned b/c rubella was non-immune this time.  She is worried b/c of recent measles cases.     Her prenatal care is complicated by (and status) :   Patient Active Problem List   Diagnosis    Closed displaced fracture of head of right radius    Injury of triangular fibrocartilage complex of right wrist    Sprain of anterior talofibular ligament of left ankle    HSV infection    Rh negative, antepartum    Multigravida in first trimester       Genetic testing?: declines.  NOB labs reviewed  Flu Status: Already given in current flu season  Ultrasound Today: No    ROS -   Patient Denies: vaginal bleeding  Fetal Movement: NA  Other than what is documented in the HPI, all other systems reviewed and are negative.     The additional following portions of the patient's history were reviewed and updated as appropriate: allergies and current medications.    I have reviewed and agree with the HPI, ROS, and historical information as entered above. Osiris Rubi MD          /70   Wt 88 kg (194 lb)   LMP 2024   BMI 30.38 kg/m²         EXAM:     Prenatal Vitals  BP: 108/70  Weight: 88 kg (194 lb)   Fetal Heart Rate: 149          Urine Glucose Read-only: Negative  Urine Protein Read-only: Negative       Assessment and Plan    Problem List Items Addressed This Visit    None  Visit Diagnoses         12 weeks gestation of pregnancy    -  Primary    Relevant Orders    POC Urinalysis Dipstick (Completed)            Pregnancy at 12w1d  Labs reviewed from New OB Visit.  Counseled on genetic testing, carrier status and option for NT screen  Activity and Exercise discussed.  Reassurance re: measles (limited cases).  I definitely recommend vaccinating children and getting MMR when  not pregnant.  Patient is on Prenatal vitamins  Reassurance re: ?growing pains vs. Encouraged fiber, stool softener for constipation.    Return in about 4 weeks (around 4/11/2025) for Next scheduled follow up.    Osiris Rubi MD  03/14/2025

## 2025-03-15 ENCOUNTER — APPOINTMENT (OUTPATIENT)
Facility: HOSPITAL | Age: 35
End: 2025-03-15
Payer: OTHER GOVERNMENT

## 2025-03-15 ENCOUNTER — HOSPITAL ENCOUNTER (EMERGENCY)
Facility: HOSPITAL | Age: 35
Discharge: HOME | End: 2025-03-15
Attending: EMERGENCY MEDICINE
Payer: OTHER GOVERNMENT

## 2025-03-15 VITALS
DIASTOLIC BLOOD PRESSURE: 77 MMHG | OXYGEN SATURATION: 98 % | RESPIRATION RATE: 18 BRPM | WEIGHT: 196 LBS | HEART RATE: 66 BPM | BODY MASS INDEX: 31.5 KG/M2 | HEIGHT: 66 IN | TEMPERATURE: 98.3 F | SYSTOLIC BLOOD PRESSURE: 106 MMHG

## 2025-03-15 DIAGNOSIS — R82.71 BACTERIURIA: ICD-10-CM

## 2025-03-15 DIAGNOSIS — O20.0 THREATENED MISCARRIAGE: Primary | ICD-10-CM

## 2025-03-15 LAB
ABO GROUP BLD: NORMAL
ALBUMIN SERPL-MCNC: 4.2 G/DL (ref 3.5–5.2)
ALBUMIN/GLOB SERPL: 2 G/DL
ALP SERPL-CCNC: 45 U/L (ref 39–117)
ALT SERPL W P-5'-P-CCNC: 8 U/L (ref 1–33)
ANION GAP SERPL CALCULATED.3IONS-SCNC: 10.7 MMOL/L (ref 5–15)
AST SERPL-CCNC: 12 U/L (ref 1–32)
BACTERIA UR QL AUTO: ABNORMAL /HPF
BASOPHILS # BLD AUTO: 0.01 10*3/MM3 (ref 0–0.2)
BASOPHILS NFR BLD AUTO: 0.2 % (ref 0–1.5)
BILIRUB SERPL-MCNC: 0.4 MG/DL (ref 0–1.2)
BILIRUB UR QL STRIP: NEGATIVE
BUN SERPL-MCNC: 6 MG/DL (ref 6–20)
BUN/CREAT SERPL: 11.5 (ref 7–25)
CALCIUM SPEC-SCNC: 9 MG/DL (ref 8.6–10.5)
CHLORIDE SERPL-SCNC: 105 MMOL/L (ref 98–107)
CLARITY UR: CLEAR
CO2 SERPL-SCNC: 24.3 MMOL/L (ref 22–29)
COLOR UR: YELLOW
CREAT SERPL-MCNC: 0.52 MG/DL (ref 0.57–1)
DEPRECATED RDW RBC AUTO: 39.4 FL (ref 37–54)
EGFRCR SERPLBLD CKD-EPI 2021: 124.4 ML/MIN/1.73
EOSINOPHIL # BLD AUTO: 0.03 10*3/MM3 (ref 0–0.4)
EOSINOPHIL NFR BLD AUTO: 0.6 % (ref 0.3–6.2)
ERYTHROCYTE [DISTWIDTH] IN BLOOD BY AUTOMATED COUNT: 11.8 % (ref 12.3–15.4)
GLOBULIN UR ELPH-MCNC: 2.1 GM/DL
GLUCOSE SERPL-MCNC: 92 MG/DL (ref 65–99)
GLUCOSE UR STRIP-MCNC: NEGATIVE MG/DL
HCG INTACT+B SERPL-ACNC: NORMAL MIU/ML
HCT VFR BLD AUTO: 37.2 % (ref 34–46.6)
HGB BLD-MCNC: 13.2 G/DL (ref 12–15.9)
HGB UR QL STRIP.AUTO: ABNORMAL
HOLD SPECIMEN: NORMAL
HYALINE CASTS UR QL AUTO: ABNORMAL /LPF
IMM GRANULOCYTES # BLD AUTO: 0.02 10*3/MM3 (ref 0–0.05)
IMM GRANULOCYTES NFR BLD AUTO: 0.4 % (ref 0–0.5)
KETONES UR QL STRIP: NEGATIVE
LEUKOCYTE ESTERASE UR QL STRIP.AUTO: NEGATIVE
LYMPHOCYTES # BLD AUTO: 1.26 10*3/MM3 (ref 0.7–3.1)
LYMPHOCYTES NFR BLD AUTO: 23.9 % (ref 19.6–45.3)
MCH RBC QN AUTO: 32.1 PG (ref 26.6–33)
MCHC RBC AUTO-ENTMCNC: 35.5 G/DL (ref 31.5–35.7)
MCV RBC AUTO: 90.5 FL (ref 79–97)
MONOCYTES # BLD AUTO: 0.33 10*3/MM3 (ref 0.1–0.9)
MONOCYTES NFR BLD AUTO: 6.3 % (ref 5–12)
NEUTROPHILS NFR BLD AUTO: 3.62 10*3/MM3 (ref 1.7–7)
NEUTROPHILS NFR BLD AUTO: 68.6 % (ref 42.7–76)
NITRITE UR QL STRIP: NEGATIVE
NUMBER OF DOSES: NORMAL
PH UR STRIP.AUTO: 6 [PH] (ref 5–8)
PLATELET # BLD AUTO: 203 10*3/MM3 (ref 140–450)
PMV BLD AUTO: 9.9 FL (ref 6–12)
POTASSIUM SERPL-SCNC: 3.6 MMOL/L (ref 3.5–5.2)
PROT SERPL-MCNC: 6.3 G/DL (ref 6–8.5)
PROT UR QL STRIP: NEGATIVE
RBC # BLD AUTO: 4.11 10*6/MM3 (ref 3.77–5.28)
RBC # UR STRIP: ABNORMAL /HPF
REF LAB TEST METHOD: ABNORMAL
RH BLD: NEGATIVE
SODIUM SERPL-SCNC: 140 MMOL/L (ref 136–145)
SP GR UR STRIP: <=1.005 (ref 1–1.03)
SQUAMOUS #/AREA URNS HPF: ABNORMAL /HPF
UROBILINOGEN UR QL STRIP: ABNORMAL
WBC # UR STRIP: ABNORMAL /HPF
WBC NRBC COR # BLD AUTO: 5.27 10*3/MM3 (ref 3.4–10.8)
WHOLE BLOOD HOLD COAG: NORMAL
WHOLE BLOOD HOLD SPECIMEN: NORMAL

## 2025-03-15 PROCEDURE — 25810000003 SODIUM CHLORIDE 0.9 % SOLUTION

## 2025-03-15 PROCEDURE — 81001 URINALYSIS AUTO W/SCOPE: CPT

## 2025-03-15 PROCEDURE — 86900 BLOOD TYPING SEROLOGIC ABO: CPT

## 2025-03-15 PROCEDURE — 96360 HYDRATION IV INFUSION INIT: CPT

## 2025-03-15 PROCEDURE — 85025 COMPLETE CBC W/AUTO DIFF WBC: CPT

## 2025-03-15 PROCEDURE — 99284 EMERGENCY DEPT VISIT MOD MDM: CPT

## 2025-03-15 PROCEDURE — 80053 COMPREHEN METABOLIC PANEL: CPT

## 2025-03-15 PROCEDURE — 76815 OB US LIMITED FETUS(S): CPT

## 2025-03-15 PROCEDURE — 96372 THER/PROPH/DIAG INJ SC/IM: CPT

## 2025-03-15 PROCEDURE — 87086 URINE CULTURE/COLONY COUNT: CPT

## 2025-03-15 PROCEDURE — 86901 BLOOD TYPING SEROLOGIC RH(D): CPT

## 2025-03-15 PROCEDURE — 84702 CHORIONIC GONADOTROPIN TEST: CPT

## 2025-03-15 PROCEDURE — 25010000002 RHO D IMMUNE GLOBULIN 1500 UNIT/2ML SOLUTION PREFILLED SYRINGE

## 2025-03-15 RX ORDER — SODIUM CHLORIDE 0.9 % (FLUSH) 0.9 %
10 SYRINGE (ML) INJECTION AS NEEDED
Status: DISCONTINUED | OUTPATIENT
Start: 2025-03-15 | End: 2025-03-15 | Stop reason: HOSPADM

## 2025-03-15 RX ADMIN — SODIUM CHLORIDE 1000 ML: 900 INJECTION, SOLUTION INTRAVENOUS at 11:33

## 2025-03-15 RX ADMIN — HUMAN RHO(D) IMMUNE GLOBULIN 1500 UNITS: 1500 SOLUTION INTRAMUSCULAR; INTRAVENOUS at 13:13

## 2025-03-15 RX ADMIN — AMOXICILLIN AND CLAVULANATE POTASSIUM 1 TABLET: 875; 125 TABLET, FILM COATED ORAL at 13:27

## 2025-03-15 NOTE — DISCHARGE INSTRUCTIONS
Please follow-up with your OB on Monday.  Please let them know about both of your visits to both [facility] and our hospital.  Today you have normal fetal heart tones measuring 166 bpm on ultrasound

## 2025-03-15 NOTE — FSED PROVIDER NOTE
Subjective  History of Present Illness:    Patient is a 35-year-old female who presents the emergency department today with complaints of vaginal bleeding.  Patient states she is currently 12 weeks pregnant in 2 days.  Patient states she had a normal follow-up appointment with her OB physician yesterday.  Patient states this is her seventh pregnancy.  Patient states she has had 3 miscarriages in addition to 3 viable, full-term pregnancies without complications.  Patient states the original 3 miscarriages or due to an abnormal uterus.  Patient states she had her uterus surgically corrected and has had successful pregnancies ever since.  Patient states she takes a baby aspirin daily in addition to progesterone.  Patient states she is also taking her prenatal vitamins as prescribed.  Patient states yesterday evening after intercourse with her  she had a significant amount of bright red blood vaginally.  Patient admitted to mild abdominal cramps at that time.  Patient was seen at [facility] and had a bedside ultrasound performed that showed a viable single intrauterine pregnancy with normal fetal heart tones.  Patient states they gave her a liter of normal saline as she was dehydrated at that time.  Patient states they did not give her a RhoGAM injection.  Patient states she called her OB physician today and was told to come to this emergency department for potential ultrasound in addition to a RhoGAM injection.  Patient admits to light vaginal spotting at this time.  Patient denies any abdominal pain.  Patient denies any concerns of STDs or STIs.  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  Patient is currently also taking valacyclovir for this.      Nurses Notes reviewed and agree, including vitals, allergies, social history and prior medical history.     REVIEW OF SYSTEMS: All systems reviewed and not pertinent unless noted.  Review of Systems   Constitutional:  Negative for chills, diaphoresis and  fever.   HENT:  Negative for ear pain, rhinorrhea and sore throat.    Respiratory:  Negative for cough, chest tightness, shortness of breath and wheezing.    Cardiovascular:  Negative for chest pain and palpitations.   Gastrointestinal:  Negative for abdominal pain, nausea and vomiting.   Genitourinary:  Positive for vaginal bleeding. Negative for dyspareunia, dysuria, flank pain and pelvic pain.   Skin:  Negative for color change.   Neurological:  Negative for dizziness, seizures, weakness, light-headedness and headaches.   All other systems reviewed and are negative.      Past Medical History:   Diagnosis Date    Abnormal Pap smear of cervix     ADHD (attention deficit hyperactivity disorder)     Haven’t taken meds for it since     Anemia     Had to be put on iron in college and then slow fe during pregnancies    Ankle sprain     Have one currently and several before this    Female infertility     Herpes     Hsv1    Low back strain     Multiple gestation     Ovarian cyst     Wrist sprain        Allergies:    Patient has no known allergies.      Past Surgical History:   Procedure Laterality Date    COLONOSCOPY  2024    HYSTEROSCOPY  2018    hysteroscopic metroplasty Semi septate uterus    UTERINE SEPTUM REMOVAL      semi septate         Social History     Socioeconomic History    Marital status:    Tobacco Use    Smoking status: Former     Current packs/day: 0.00     Average packs/day: 0.3 packs/day for 6.0 years (1.5 ttl pk-yrs)     Types: Cigarettes     Start date: 2008     Quit date:      Years since quittin.2    Smokeless tobacco: Never    Tobacco comments:     Smoked socially when drinking over 10 years ago   Vaping Use    Vaping status: Never Used   Substance and Sexual Activity    Alcohol use: Not Currently     Alcohol/week: 1.0 - 2.0 standard drink of alcohol     Types: 1 - 2 Glasses of wine per week     Comment: Not drinking at all right now    Drug use: Never     "Sexual activity: Yes     Partners: Male     Birth control/protection: Vasectomy     Comment:  had a vasectomy         Family History   Problem Relation Age of Onset    Hypertension Mother     Obesity Father     Cancer Father         Colon cancer    Colon cancer Father     Cancer Paternal Grandmother         My gpa had thyroid cancer       Objective  Physical Exam:  /77   Pulse 66   Temp 98.3 °F (36.8 °C) (Oral)   Resp 18   Ht 167.6 cm (66\")   Wt 88.9 kg (196 lb)   LMP 12/09/2024   SpO2 98%   BMI 31.64 kg/m²      Physical Exam  Vitals and nursing note reviewed. Exam conducted with a chaperone present.   Constitutional:       Appearance: Normal appearance. She is normal weight.   HENT:      Head: Normocephalic and atraumatic.      Nose: Nose normal.      Mouth/Throat:      Mouth: Mucous membranes are moist.      Pharynx: Oropharynx is clear.   Eyes:      Extraocular Movements: Extraocular movements intact.      Conjunctiva/sclera: Conjunctivae normal.      Pupils: Pupils are equal, round, and reactive to light.   Cardiovascular:      Rate and Rhythm: Normal rate and regular rhythm.      Pulses: Normal pulses.      Heart sounds: Normal heart sounds.   Pulmonary:      Effort: Pulmonary effort is normal.      Breath sounds: Normal breath sounds. No wheezing, rhonchi or rales.   Abdominal:      General: Bowel sounds are normal. There is no distension.      Palpations: Abdomen is soft. There is no mass.      Tenderness: There is no abdominal tenderness.   Genitourinary:     Exam position: Lithotomy position.      Vagina: Normal.      Cervix: Discharge and cervical bleeding present. No cervical motion tenderness, friability, lesion or erythema.      Comments: Closed cervical os.  Scant blood  Musculoskeletal:         General: Normal range of motion.      Cervical back: Normal range of motion.   Skin:     General: Skin is warm and dry.      Capillary Refill: Capillary refill takes less than 2 seconds. "   Neurological:      General: No focal deficit present.      Mental Status: She is alert and oriented to person, place, and time. Mental status is at baseline.   Psychiatric:         Mood and Affect: Mood normal.         Behavior: Behavior normal.         Thought Content: Thought content normal.         Judgment: Judgment normal.         Procedures    ED Course:    ED Course as of 03/15/25 1629   Sat Mar 15, 2025   1310 HCG Quantitative: 35,889.00 [RAVIN]      ED Course User Index  [RAVIN] Juan Francisco Argueta MD       Lab Results (last 24 hours)       Procedure Component Value Units Date/Time    Urinalysis With Culture If Indicated - Urine, Clean Catch [[id number]]  (Abnormal) Collected: 03/15/25 1117    Specimen: Urine, Clean Catch Updated: 03/15/25 1143     Color, UA Yellow     Appearance, UA Clear     pH, UA 6.0     Specific Gravity, UA <=1.005     Glucose, UA Negative     Ketones, UA Negative     Bilirubin, UA Negative     Blood, UA Small (1+)     Protein, UA Negative     Leuk Esterase, UA Negative     Nitrite, UA Negative     Urobilinogen, UA 0.2 E.U./dL    Narrative:      In absence of clinical symptoms, the presence of pyuria, bacteria, and/or nitrites on the urinalysis result does not correlate with infection.    Urinalysis, Microscopic Only - Urine, Clean Catch [[id number]]  (Abnormal) Collected: 03/15/25 1117    Specimen: Urine, Clean Catch Updated: 03/15/25 1144     RBC, UA 3-5 /HPF      WBC, UA 0-2 /HPF      Bacteria, UA 1+ /HPF      Squamous Epithelial Cells, UA 0-2 /HPF      Hyaline Casts, UA None Seen /LPF      Methodology Manual Light Microscopy    Urine Culture - Urine, Urine, Clean Catch [[id number]] Collected: 03/15/25 1117    Specimen: Urine, Clean Catch Updated: 03/15/25 1140    CBC & Differential [[id number]]  (Abnormal) Collected: 03/15/25 1122    Specimen: Blood Updated: 03/15/25 1129    Narrative:      The following orders were created for panel order CBC & Differential.  Procedure                                Abnormality         Status                     ---------                               -----------         ------                     CBC Auto Differential[[id number]]        Abnormal            Final result                 Please view results for these tests on the individual orders.    hCG, Quantitative, Pregnancy [[id number]] Collected: 03/15/25 1122    Specimen: Blood Updated: 03/15/25 1218     HCG Quantitative 35,889.00 mIU/mL     Narrative:      HCG Ranges by Gestational Age    Females - non-pregnant premenopausal   </= 1mIU/mL HCG  Females - postmenopausal               </= 7mIU/mL HCG    3 Weeks         5.8 -    71.2 mIU/mL  4 Weeks         9.5 -     750 mIU/mL  5 Weeks         217 -   7,138 mIU/mL  6 Weeks         158 -  31,795 mIU/mL  7 Weeks       3,697 - 163,563 mIU/mL  8 Weeks      32,065 - 149,571 mIU/mL  9 Weeks      63,803 - 151,410 mIU/mL  10 Weeks     46,509 - 186,977 mIU/mL  12 Weeks     27,832 - 210,612 mIU/mL  14 Weeks     13,950 -  62,530 mIU/mL  15 Weeks     12,039 -  70,971 mIU/mL  16 Weeks      9,040 -  56,451 mIU/mL  17 Weeks      8,175 -  55,868 mIU/mL  18 Weeks      8,099 -  58,176 mIU/mL    Comprehensive Metabolic Panel [[id number]]  (Abnormal) Collected: 03/15/25 1122    Specimen: Blood Updated: 03/15/25 1146     Glucose 92 mg/dL      BUN 6 mg/dL      Creatinine 0.52 mg/dL      Sodium 140 mmol/L      Potassium 3.6 mmol/L      Chloride 105 mmol/L      CO2 24.3 mmol/L      Calcium 9.0 mg/dL      Total Protein 6.3 g/dL      Albumin 4.2 g/dL      ALT (SGPT) 8 U/L      AST (SGOT) 12 U/L      Alkaline Phosphatase 45 U/L      Total Bilirubin 0.4 mg/dL      Globulin 2.1 gm/dL      A/G Ratio 2.0 g/dL      BUN/Creatinine Ratio 11.5     Anion Gap 10.7 mmol/L      eGFR 124.4 mL/min/1.73     Narrative:      GFR Categories in Chronic Kidney Disease (CKD)      GFR Category          GFR (mL/min/1.73)    Interpretation  G1                     90 or greater         Normal or high (1)  G2                       60-89                Mild decrease (1)  G3a                   45-59                Mild to moderate decrease  G3b                   30-44                Moderate to severe decrease  G4                    15-29                Severe decrease  G5                    14 or less           Kidney failure          (1)In the absence of evidence of kidney disease, neither GFR category G1 or G2 fulfill the criteria for CKD.    eGFR calculation 2021 CKD-EPI creatinine equation, which does not include race as a factor    CBC Auto Differential [[id number]]  (Abnormal) Collected: 03/15/25 1122    Specimen: Blood Updated: 03/15/25 1129     WBC 5.27 10*3/mm3      RBC 4.11 10*6/mm3      Hemoglobin 13.2 g/dL      Hematocrit 37.2 %      MCV 90.5 fL      MCH 32.1 pg      MCHC 35.5 g/dL      RDW 11.8 %      RDW-SD 39.4 fl      MPV 9.9 fL      Platelets 203 10*3/mm3      Neutrophil % 68.6 %      Lymphocyte % 23.9 %      Monocyte % 6.3 %      Eosinophil % 0.6 %      Basophil % 0.2 %      Immature Grans % 0.4 %      Neutrophils, Absolute 3.62 10*3/mm3      Lymphocytes, Absolute 1.26 10*3/mm3      Monocytes, Absolute 0.33 10*3/mm3      Eosinophils, Absolute 0.03 10*3/mm3      Basophils, Absolute 0.01 10*3/mm3      Immature Grans, Absolute 0.02 10*3/mm3              US Ob Limited 1 + Fetuses  Result Date: 3/15/2025  US OB LIMITED 1 + FETUSES Date of Exam: 3/15/2025 11:50 AM EDT Indication: The patient is pregnant with vaginal bleeding.. Comparison: Pregnancy ultrasound performed on 1/31/2025 and 2/14/2025. Technique: A limited pregnancy ultrasound was performed with real time scanning.  Image documentation was obtained per protocol. Findings: The uterus is in the normal anteflexed position measuring 9.6 x 6.9 x 8.6 cm. There is a single live viable intrauterine pregnancy. There is cardiac motion with a normal rate of 166 bpm. The fetal measurements indicate an estimated gestational age of 12 weeks and 3 days. The patient has an  established due date from the previous ultrasound corresponding to 2025. There is normal interval growth. There is no subchorionic hemorrhage. The left ovary is unremarkable. It measures 1.7 x 2.5 x 1.9 cm. The right ovary measures 3.5 x 2.7 x 2.5 cm and is also unremarkable. There is color Doppler flow in both ovaries. There is no free pelvic cul-de-sac fluid.     Impression: Impression: 1. Single viable intrauterine pregnancy with an estimated gestational age of 12 weeks and 3 days and normal interval growth. 2. Both ovaries are normal. Electronically Signed: Jorge Luis Gallo MD  3/15/2025 12:38 PM EDT  Workstation ID: [id number]         MDM     Amount and/or Complexity of Data Reviewed  Clinical lab tests: reviewed  Tests in the radiology section of CPT®: reviewed        Initial impression of presenting illness: Vaginal bleeding during pregnancy    DDX: includes but is not limited to: Spontaneous  versus threatened miscarriage versus postcoital bleeding during pregnancy    Patient arrives private vehicle with nonactionable vitals interpreted by myself.     Pertinent features from physical exam: Patient had scant bleeding noted on pelvic exam.  Patient denied any pain.  I had a conversation regarding the pros and cons of of pelvic exam on the patient and she agreed she wanted a pelvic exam performed today..    Initial diagnostic plan: CBC, CMP, Rh type, ABO, ultrasound, urinalysis    Results from initial plan were reviewed and interpreted by me revealing patient had normal fetal heart tones in addition to a single intrauterine pregnancy noted on ultrasound.  Patient's blood type is O- therefore she was given a RhoGAM injection in the emergency department at the request of her OB physician.    Diagnostic information from other sources: Previous medical records    Interventions / Re-evaluation: Patient denies any pain throughout her stay.    Medications   sodium chloride 0.9 % bolus 1,000 mL (0 mL Intravenous  Stopped 3/15/25 1308)   Rho D Immune Globulin (RHOPHYLAC) injection 1,500 Units (1,500 Units Intramuscular Given 3/15/25 1313)   amoxicillin-clavulanate (AUGMENTIN) 875-125 MG per tablet 1 tablet (1 tablet Oral Given 3/15/25 1327)       Results/clinical rationale were discussed with patient    Consultations/Discussion of results with other physicians: Dr. Argueta    Data interpreted: Nursing notes reviewed, vital signs reviewed.  Labs independently interpreted by me (CBC, CMP, urinalysis, ABO type, Rh type).  Imaging independently interpreted by me (ultrasound).  O2 saturation: 98% on room air    Counseling: Discussed the results above with the patient regarding need for admission or discharge.  Patient understands and agrees plan of care.      Patient is a 35-year-old female who presented to the emergency department today with concerns of vaginal bleeding after sex last night.  Patient states she was evaluated at [facility] and was found to have normal fetal heart tones upon bedside ultrasound.  Patient states she did not receive the RhoGAM injection at that time.  Patient was requesting this at this time in addition to an ultrasound per her OBs instructions.  Patient's ultrasound today revealed a single intrauterine pregnancy with normal fetal heart tones.  Patient CBC and CMP were nonactionable.  Patient's blood type is O-.  Patient received a RhoGAM injection in the emergency department.  Patient had bacteria noted on her urinalysis therefore she was given Augmentin and started on a short course of Augmentin.  Patient was given a liter normal saline in the ER.  Patient denied any vaginal pain or back pain.  Patient denied any concerns of STDs or STIs.  Patient had a pelvic exam performed after discussing the pros and cons and she was noted to have scant bleeding coming from her closed cervical os.  Patient will be discharged home in stable condition at this time.  Patient was informed of the need to  follow-up with her OB physician on Monday.  Patient voiced understanding the plan of care.  Patient was informed of concerning symptoms that require immediate return to emergency department.    -----  ED Disposition       ED Disposition   Discharge    Condition   Stable    Comment   --             Final diagnoses:   Threatened miscarriage   Bacteriuria      Your Follow-Up Providers       Willie Vazquez MD. Schedule an appointment as soon as possible for a visit in 1 week.    Specialty: Family Medicine  [address]  [facility] [address] [phone]               Osiris Rubi MD. Call on 3/17/2025.    Specialties: Obstetrics and Gynecology, Gynecology  Follow up details: Follow-up  [address]  [facility] [address] [phone]                       Contact information for after-discharge care    Follow-up information has not been specified.                    Your medication list        START taking these medications        Instructions Last Dose Given Next Dose Due   amoxicillin-clavulanate 875-125 MG per tablet  Commonly known as: AUGMENTIN      Take 1 tablet by mouth 2 (Two) Times a Day.              CONTINUE taking these medications        Instructions Last Dose Given Next Dose Due   aspirin 81 MG EC tablet      Take 1 tablet by mouth Daily.       Progesterone 200 MG capsule  Commonly known as: Prometrium      Insert 1 capsule into the vagina Daily.       valACYclovir 1000 MG tablet  Commonly known as: Valtrex      Take 1 tablet by mouth Daily.                 Where to Get Your Medications        These medications were sent to [id number] - [place], [place] - [address] [place] [address] AT SEC OF [address] [facility] - [phone]  - [phone]   [address] [facility] [address]      Phone: [phone]   amoxicillin-clavulanate 875-125 MG per tablet

## 2025-03-17 ENCOUNTER — TELEPHONE (OUTPATIENT)
Dept: OBSTETRICS AND GYNECOLOGY | Facility: CLINIC | Age: 35
End: 2025-03-17
Payer: OTHER GOVERNMENT

## 2025-03-17 LAB — BACTERIA SPEC AEROBE CULT: NORMAL

## 2025-03-17 NOTE — TELEPHONE ENCOUNTER
Spoke with pt. 12w4d, pt  of Dr. Rubi. LOV 3/14. That evening after her visit she had bleeding after sex. She went to the hospital in [place], [facility] they did an ultrasound that was normal but they did not give her rhogam. We cannot see those records. She called the on call NP Friday night, and they advised her to go to [facility] for rhogam. She went to [facility]. They repeated ultrasound which was normal. , size+dates, and no subchorionic hemorrhage. Rhogam was administered. Also did vaginal exam, cervix was closed, some light bleeding from cervix. Urinalysis +for bacteria and RBC so they treated her for UTI with Augmentin. She has had no bleeding since Saturday morning.   Next visit is 4/14 with JERRY Morris. Advised to call with any further bleeding, and to observe pelvic rest. No sex or anything in the vagina, no tampons, avoid heavy lifting or straining with BM. Discussed with Dr. Rubi and she agrees with plan of care. I did advise Renetta not to go to [facility] for pregnancy complaints, as they do not have the same resources we do at the [facility]. Pt verbalizes understanding.

## 2025-03-17 NOTE — TELEPHONE ENCOUNTER
[facility] staff attempted to follow warm transfer process and was unsuccessful     Caller: Renetta Garrido    Relationship to patient: Self    Best call back number: [phone] CALL ANYTIME. IT IS OKAY TO LVM.    Patient is needing: OB PATIENT 12 WEEKS, 4 DAYS CALLED TO DISCUSS ED VISITS OVER THE WEEKEND.  FRIDAY NOTICED SOME SPOTTING AFTER INTERCOURSE AND WENT TO [facility] ED. [facility] ED TOLD TO CALL OFFICE SATURDAY MORNING AND SPEAK WITH ON CALL PROVIDER. SATURDAY, PATIENT SPOKE WITH AFTER HOURS NURSE AND WAS DIRECTED TO GO TO [facility] FOR RHOGAM SHOT. PATIENT ALSO RECEIVED AND SHOT FOR BACTERIA IN URINE.      PATIENT STATES SPOTTING STOPPED SATURDAY MORNING. [facility] UNABLE TO TRANSFER CALL TO CLINICAL.

## 2025-04-14 ENCOUNTER — ROUTINE PRENATAL (OUTPATIENT)
Dept: OBSTETRICS AND GYNECOLOGY | Facility: CLINIC | Age: 35
End: 2025-04-14
Payer: OTHER GOVERNMENT

## 2025-04-14 VITALS — BODY MASS INDEX: 32.12 KG/M2 | SYSTOLIC BLOOD PRESSURE: 110 MMHG | WEIGHT: 199 LBS | DIASTOLIC BLOOD PRESSURE: 72 MMHG

## 2025-04-14 DIAGNOSIS — Z34.82 PRENATAL CARE, SUBSEQUENT PREGNANCY IN SECOND TRIMESTER: Primary | ICD-10-CM

## 2025-04-14 DIAGNOSIS — Z36.89 ENCOUNTER FOR FETAL ANATOMIC SURVEY: ICD-10-CM

## 2025-04-14 PROBLEM — O09.529 AMA (ADVANCED MATERNAL AGE) MULTIGRAVIDA 35+: Status: ACTIVE | Noted: 2025-04-14

## 2025-04-14 PROCEDURE — 0502F SUBSEQUENT PRENATAL CARE: CPT | Performed by: NURSE PRACTITIONER

## 2025-04-14 NOTE — PROGRESS NOTES
OB FOLLOW UP  CC- Here for care of pregnancy         Renetta Garrido is a 35 y.o.  16w4d patient being seen today for her obstetrical follow up visit. Patient reports constipation.  She inconsistently has used miralax.      Her prenatal care is complicated by (and status) :   Patient Active Problem List   Diagnosis    Closed displaced fracture of head of right radius    Injury of triangular fibrocartilage complex of right wrist    Sprain of anterior talofibular ligament of left ankle    HSV infection    Rh negative, antepartum    Multigravida in first trimester    AMA (advanced maternal age) multigravida 35+     Ultrasound Today: No    AFP: declines    ROS -   Patient Denies: leaking of fluid, vaginal bleeding, dysuria, excessive vomiting, and more than 6 contractions per hour  Fetal Movement: present  Other than what is documented in the HPI, all other systems reviewed and are negative.       The additional following portions of the patient's history were reviewed and updated as appropriate: allergies, current medications, past family history, past medical history, past social history, past surgical history, and problem list.      I have reviewed and agree with the HPI, ROS, and historical information as entered above. Radha Burks, APRN          EXAM:     Prenatal Vitals  BP: 110/72  Weight: 90.3 kg (199 lb)                         Assessment and Plan    Problem List Items Addressed This Visit    None  Visit Diagnoses         Prenatal care, subsequent pregnancy in second trimester    -  Primary    Relevant Orders    POC Glucose, Urine, Qualitative, Dipstick    POC Protein, Urine, Qualitative, Dipstick    US Ob 14 + Weeks Single or First Gestation      Encounter for fetal anatomic survey        Relevant Orders    US Ob 14 + Weeks Single or First Gestation          Increase water, fiber intake. Can use miralax and/or stool softener daily. Can use MOM prn as well.     Pregnancy at 16w4d  Fetal  status reassuring.   Counseled on MSAFP alone in relation to OTD and placental issues.  Declines  Anatomy scan next visit. Declines PDC for now   Activity and Exercise discussed.  U/S ordered at follow up  Patient is on Prenatal vitamins  Return in about 4 weeks (around 5/12/2025) for U/S JO .    Radha Burks, APRN  04/14/2025

## 2025-04-23 ENCOUNTER — ROUTINE PRENATAL (OUTPATIENT)
Dept: OBSTETRICS AND GYNECOLOGY | Facility: CLINIC | Age: 35
End: 2025-04-23
Payer: OTHER GOVERNMENT

## 2025-04-23 VITALS — SYSTOLIC BLOOD PRESSURE: 98 MMHG | WEIGHT: 200.2 LBS | BODY MASS INDEX: 32.31 KG/M2 | DIASTOLIC BLOOD PRESSURE: 62 MMHG

## 2025-04-23 DIAGNOSIS — Z34.82 PRENATAL CARE, SUBSEQUENT PREGNANCY, SECOND TRIMESTER: Primary | ICD-10-CM

## 2025-04-23 DIAGNOSIS — M79.662 PAIN OF LEFT CALF: ICD-10-CM

## 2025-04-23 LAB
GLUCOSE UR STRIP-MCNC: NEGATIVE MG/DL
PROT UR STRIP-MCNC: NEGATIVE MG/DL

## 2025-04-23 NOTE — TELEPHONE ENCOUNTER
JO patient- 17 6/7    Patient called with concerns regarding pain in her left calf.  She noticed the pain late yesterday evening, and it caused intermittent sleep disruption due to tenderness.  She denies warmth to touch or redness.  Denies severe pain, but describes as a weird sensitivity and discomfort.  Patient scheduled for appt today, with Nohemy @ 10:30.  Patient verified and voiced understanding

## 2025-04-23 NOTE — PROGRESS NOTES
"             Renetta Garrido is a 35 y.o.  17w6d patient being seen today for left calf pains, headaches, and racing heart (last night) with lightheadedness.     Patient reports that she was feeling lightheaded with racing heart and mild chest pressure yesterday afternoon. She checked her BP and it was 106/63. She does have a history of anxiety and states she thinks those symptoms were related.   She reports she started to notice the pain late yesterday evening, causing intermittent sleep disruption due to tenderness. She describes pain as a \"weird sensitivity\". Denies warmth or redness.         Her prenatal care is complicated by (and status) :    Patient Active Problem List   Diagnosis    Closed displaced fracture of head of right radius    Injury of triangular fibrocartilage complex of right wrist    Sprain of anterior talofibular ligament of left ankle    HSV infection    Rh negative, antepartum    Multigravida in first trimester    AMA (advanced maternal age) multigravida 35+         Ultrasound Today: No.    ROS -     Patient Denies: Loss of Fluid, Vaginal Spotting, Vision Changes, Nausea , Vomiting , Contractions, Epigastric pain, and skin itching  Fetal Movement : normal  All other systems reviewed and are negative.       The additional following portions of the patient's history were reviewed and updated as appropriate: allergies and current medications.    I have reviewed and agree with the HPI, ROS, and historical information as entered above. JERRY Estes      BP 98/62   Wt 90.8 kg (200 lb 3.2 oz)   LMP 2024   BMI 32.31 kg/m²       EXAM:     Prenatal Vitals  BP: 98/62  Weight: 90.8 kg (200 lb 3.2 oz)   Fetal Heart Rate: 150           No edema in lower extremities. Left calf slightly tender to palpation. No warmth, redness, swelling, palpable cord/masses.       Assessment and Plan    Problem List Items Addressed This Visit    None  Visit Diagnoses         Prenatal care, subsequent " pregnancy, second trimester    -  Primary    Relevant Orders    POC Urinalysis Dipstick (Completed)    Duplex Venous Lower Extremity - Left CAR      Pain of left calf        Relevant Orders    Duplex Venous Lower Extremity - Left CAR            Pregnancy at 17w6d  Fetal status reassuring.   Discussed warning signs for DVT, including warmth, redness, swelling, palpable mass. If she experiences any of these s/s, call us during business hours, or go to [facility] ([facility] [place]).   Discussed with Dr. Rubi, offered pt venous doppler ultrasound to rule out DVT. Order placed as stat.   Return for regularly scheduled OB appointment.  RTC for worsening symptoms      JERRY Estes  04/23/2025     A portion of the care provided today was to address left calf pain, which is outside the scope of routine prenatal care.

## 2025-04-24 ENCOUNTER — HOSPITAL ENCOUNTER (OUTPATIENT)
Dept: CARDIOLOGY | Facility: HOSPITAL | Age: 35
Discharge: HOME | End: 2025-04-24
Admitting: ADVANCED PRACTICE MIDWIFE
Payer: OTHER GOVERNMENT

## 2025-04-24 DIAGNOSIS — Z34.82 PRENATAL CARE, SUBSEQUENT PREGNANCY, SECOND TRIMESTER: ICD-10-CM

## 2025-04-24 DIAGNOSIS — M79.662 PAIN OF LEFT CALF: ICD-10-CM

## 2025-04-24 LAB
BH CV LOWER VASCULAR LEFT COMMON FEMORAL AUGMENT: NORMAL
BH CV LOWER VASCULAR LEFT COMMON FEMORAL COMPRESS: NORMAL
BH CV LOWER VASCULAR LEFT COMMON FEMORAL PHASIC: NORMAL
BH CV LOWER VASCULAR LEFT COMMON FEMORAL SPONT: NORMAL
BH CV LOWER VASCULAR LEFT DISTAL FEMORAL AUGMENT: NORMAL
BH CV LOWER VASCULAR LEFT DISTAL FEMORAL COMPRESS: NORMAL
BH CV LOWER VASCULAR LEFT DISTAL FEMORAL PHASIC: NORMAL
BH CV LOWER VASCULAR LEFT DISTAL FEMORAL SPONT: NORMAL
BH CV LOWER VASCULAR LEFT GASTRONEMIUS COMPRESS: NORMAL
BH CV LOWER VASCULAR LEFT GREATER SAPH AK COMPRESS: NORMAL
BH CV LOWER VASCULAR LEFT GREATER SAPH BK COMPRESS: NORMAL
BH CV LOWER VASCULAR LEFT LESSER SAPH COMPRESS: NORMAL
BH CV LOWER VASCULAR LEFT MID FEMORAL AUGMENT: NORMAL
BH CV LOWER VASCULAR LEFT MID FEMORAL COMPRESS: NORMAL
BH CV LOWER VASCULAR LEFT MID FEMORAL PHASIC: NORMAL
BH CV LOWER VASCULAR LEFT MID FEMORAL SPONT: NORMAL
BH CV LOWER VASCULAR LEFT PERONEAL COMPRESS: NORMAL
BH CV LOWER VASCULAR LEFT POPLITEAL AUGMENT: NORMAL
BH CV LOWER VASCULAR LEFT POPLITEAL COMPRESS: NORMAL
BH CV LOWER VASCULAR LEFT POPLITEAL PHASIC: NORMAL
BH CV LOWER VASCULAR LEFT POPLITEAL SPONT: NORMAL
BH CV LOWER VASCULAR LEFT POSTERIOR TIBIAL COMPRESS: NORMAL
BH CV LOWER VASCULAR LEFT PROFUNDA FEMORAL AUGMENT: NORMAL
BH CV LOWER VASCULAR LEFT PROFUNDA FEMORAL PHASIC: NORMAL
BH CV LOWER VASCULAR LEFT PROFUNDA FEMORAL SPONT: NORMAL
BH CV LOWER VASCULAR LEFT PROXIMAL FEMORAL AUGMENT: NORMAL
BH CV LOWER VASCULAR LEFT PROXIMAL FEMORAL COMPRESS: NORMAL
BH CV LOWER VASCULAR LEFT PROXIMAL FEMORAL PHASIC: NORMAL
BH CV LOWER VASCULAR LEFT PROXIMAL FEMORAL SPONT: NORMAL
BH CV LOWER VASCULAR LEFT SAPHENOFEMORAL JUNCTION AUGMENT: NORMAL
BH CV LOWER VASCULAR LEFT SAPHENOFEMORAL JUNCTION COMPRESS: NORMAL
BH CV LOWER VASCULAR LEFT SAPHENOFEMORAL JUNCTION PHASIC: NORMAL
BH CV LOWER VASCULAR LEFT SAPHENOFEMORAL JUNCTION SPONT: NORMAL
BH CV LOWER VASCULAR RIGHT COMMON FEMORAL AUGMENT: NORMAL
BH CV LOWER VASCULAR RIGHT COMMON FEMORAL PHASIC: NORMAL
BH CV LOWER VASCULAR RIGHT COMMON FEMORAL SPONT: NORMAL

## 2025-04-24 PROCEDURE — 93971 EXTREMITY STUDY: CPT

## 2025-05-12 ENCOUNTER — ROUTINE PRENATAL (OUTPATIENT)
Dept: OBSTETRICS AND GYNECOLOGY | Facility: CLINIC | Age: 35
End: 2025-05-12
Payer: OTHER GOVERNMENT

## 2025-05-12 VITALS — BODY MASS INDEX: 32.83 KG/M2 | WEIGHT: 203.4 LBS | SYSTOLIC BLOOD PRESSURE: 100 MMHG | DIASTOLIC BLOOD PRESSURE: 60 MMHG

## 2025-05-12 DIAGNOSIS — Z34.82 MULTIGRAVIDA IN SECOND TRIMESTER: Primary | ICD-10-CM

## 2025-05-12 DIAGNOSIS — O09.522 AMA (ADVANCED MATERNAL AGE) MULTIGRAVIDA 35+, SECOND TRIMESTER: ICD-10-CM

## 2025-05-12 LAB
GLUCOSE UR STRIP-MCNC: NEGATIVE MG/DL
PROT UR STRIP-MCNC: NEGATIVE MG/DL

## 2025-05-12 RX ORDER — CALCIUM CARB, CITRATE, MALATE 250 MG
CAPSULE ORAL
COMMUNITY

## 2025-05-12 NOTE — PROGRESS NOTES
OB FOLLOW UP  CC- Here for care of pregnancy        Renetta Garrido is a 35 y.o.  20w4d patient being seen today for her obstetrical follow up visit. Patient reports occasional headaches, low back pain, and pelvic pressure.     Her prenatal care is complicated by (and status) : see below.  Patient Active Problem List   Diagnosis    Closed displaced fracture of head of right radius    Injury of triangular fibrocartilage complex of right wrist    Sprain of anterior talofibular ligament of left ankle    HSV infection    Rh negative, antepartum    Multigravida in first trimester    AMA (advanced maternal age) multigravida 35+    AMA (advanced maternal age) multigravida 35+, second trimester    Multigravida in second trimester       Flu Status: Already given in current flu season  Ultrasound Today: Yes  AFP was declined.    ROS -     Patient Denies: leaking of fluid, vaginal bleeding, dysuria, excessive vomiting, and more than 6 contractions per hour  Fetal Movement : Yes  Other than what is documented in the HPI, all other systems reviewed and are negative.       The additional following portions of the patient's history were reviewed and updated as appropriate: allergies, current medications, past family history, past medical history, past social history, past surgical history, and problem list.      I have reviewed and agree with the HPI, ROS, and historical information as entered above. Osiris Rubi MD      /60   Wt 92.3 kg (203 lb 6.4 oz)   LMP 2024   BMI 32.83 kg/m²       EXAM:     Prenatal Vitals  BP: 100/60  Weight: 92.3 kg (203 lb 6.4 oz)   Fetal Heart Rate: US          Urine Glucose Read-only: Negative  Urine Protein Read-only: Negative       Assessment and Plan    Problem List Items Addressed This Visit       AMA (advanced maternal age) multigravida 35+, second trimester    Relevant Orders    POC Urinalysis Dipstick (Completed)    Multigravida in second trimester - Primary     Relevant Orders    POC Urinalysis Dipstick (Completed)       Pregnancy at 20w4d  Anatomy scan today is complete and appear within normal limits.  Fetal status reassuring.   Activity and Exercise discussed.  Patient is on Prenatal vitamins  Return in about 4 weeks (around 6/9/2025) for Next scheduled follow up.    Osiris Rubi MD  05/12/2025

## 2025-05-24 PROBLEM — Z34.82 MULTIGRAVIDA IN SECOND TRIMESTER: Status: ACTIVE | Noted: 2025-05-24

## 2025-05-24 PROBLEM — O09.522 AMA (ADVANCED MATERNAL AGE) MULTIGRAVIDA 35+, SECOND TRIMESTER: Status: ACTIVE | Noted: 2025-05-24

## 2025-06-05 ENCOUNTER — ROUTINE PRENATAL (OUTPATIENT)
Dept: OBSTETRICS AND GYNECOLOGY | Facility: CLINIC | Age: 35
End: 2025-06-05
Payer: OTHER GOVERNMENT

## 2025-06-05 VITALS — BODY MASS INDEX: 33.41 KG/M2 | SYSTOLIC BLOOD PRESSURE: 102 MMHG | DIASTOLIC BLOOD PRESSURE: 70 MMHG | WEIGHT: 207 LBS

## 2025-06-05 DIAGNOSIS — O26.899 RH NEGATIVE, ANTEPARTUM: ICD-10-CM

## 2025-06-05 DIAGNOSIS — O09.522 MULTIGRAVIDA OF ADVANCED MATERNAL AGE IN SECOND TRIMESTER: ICD-10-CM

## 2025-06-05 DIAGNOSIS — B00.9 HSV INFECTION: ICD-10-CM

## 2025-06-05 DIAGNOSIS — Z67.91 RH NEGATIVE, ANTEPARTUM: ICD-10-CM

## 2025-06-05 DIAGNOSIS — Z34.92 SECOND TRIMESTER PREGNANCY: Primary | ICD-10-CM

## 2025-06-05 PROBLEM — Z34.81 MULTIGRAVIDA IN FIRST TRIMESTER: Status: RESOLVED | Noted: 2025-01-31 | Resolved: 2025-06-05

## 2025-06-05 LAB
GLUCOSE UR STRIP-MCNC: NEGATIVE MG/DL
PROT UR STRIP-MCNC: NEGATIVE MG/DL

## 2025-06-05 NOTE — PROGRESS NOTES
OB FOLLOW UP  CC- Here for care of pregnancy        Renetta Garrido is a 35 y.o.  24w0d patient being seen today for her obstetrical follow up visit. Patient reports no complaints. 28WK INSTR./O-. Pt feels well, good fm, no c/o    Her prenatal care is complicated by (and status) : see below.  Patient Active Problem List   Diagnosis    Closed displaced fracture of head of right radius    Injury of triangular fibrocartilage complex of right wrist    Sprain of anterior talofibular ligament of left ankle    HSV infection    Rh negative, antepartum    AMA (advanced maternal age) multigravida 35+    Multigravida in second trimester    Second trimester pregnancy       Flu Status: Already given in current flu season  Ultrasound Today: No  Reviewed 1 hr glucose testing, TDAP, and Rhogam next visit.    ROS -   Patient Denies: leaking of fluid, vaginal bleeding, dysuria, excessive vomiting, and more than 6 contractions per hour  Fetal Movement : normal  Other than what is documented in the HPI, all other systems reviewed and are negative.       The additional following portions of the patient's history were reviewed and updated as appropriate: allergies.      I have reviewed and agree with the HPI, ROS, and historical information as entered above. Osiris Rubi MD      /70   Wt 93.9 kg (207 lb)   LMP 2024   BMI 33.41 kg/m²       EXAM:     Prenatal Vitals  BP: 102/70  Weight: 93.9 kg (207 lb)   Fetal Heart Rate: pos               Urine Glucose Read-only: Negative  Urine Protein Read-only: Negative       Assessment and Plan    Problem List Items Addressed This Visit       HSV infection    Overview   On suppression as of today 2/15/24         Relevant Medications    valACYclovir (Valtrex) 1000 MG tablet    Rh negative, antepartum    AMA (advanced maternal age) multigravida 35+    Second trimester pregnancy - Primary    Relevant Orders    POC Urinalysis Dipstick (Completed)       Pregnancy at  24w0d  Fetal status reassuring.  No US indicated today.  1 hour gtt, CBC, Antibody screen, TDAP, and RPR next visit. Instructions given  Fetal movement/PTL or Labor precautions  Reviewed routine prenatal care with the office and educational materials given  Discussed/encouraged TDAP vaccination after 28 weeks  Activity and Exercise discussed.  Return in about 4 weeks (around 7/3/2025) for 28wk labs.      Osiris Rubi MD  06/05/2025

## 2025-06-17 ENCOUNTER — OFFICE VISIT (OUTPATIENT)
Dept: FAMILY MEDICINE CLINIC | Facility: CLINIC | Age: 35
End: 2025-06-17
Payer: OTHER GOVERNMENT

## 2025-06-17 VITALS
HEIGHT: 66 IN | SYSTOLIC BLOOD PRESSURE: 110 MMHG | RESPIRATION RATE: 18 BRPM | TEMPERATURE: 98.6 F | HEART RATE: 96 BPM | DIASTOLIC BLOOD PRESSURE: 68 MMHG | WEIGHT: 210 LBS | BODY MASS INDEX: 33.75 KG/M2

## 2025-06-17 DIAGNOSIS — H10.32 ACUTE BACTERIAL CONJUNCTIVITIS OF LEFT EYE: Primary | ICD-10-CM

## 2025-06-17 PROCEDURE — 99213 OFFICE O/P EST LOW 20 MIN: CPT | Performed by: FAMILY MEDICINE

## 2025-06-17 RX ORDER — TOBRAMYCIN 3 MG/ML
2 SOLUTION/ DROPS OPHTHALMIC 3 TIMES DAILY
Qty: 5 ML | Refills: 0 | Status: SHIPPED | OUTPATIENT
Start: 2025-06-17

## 2025-06-17 NOTE — PROGRESS NOTES
Subjective   Renetta Garrido is a 35 y.o. female.     History of Present Illness     She has had itchy and irritated eye the past 24 hours  Has had drainage and discharge  Has been red as well  Does not war contacts      The following portions of the patient's history were reviewed and updated as appropriate: allergies, current medications, past family history, past medical history, past social history, past surgical history, and problem list.    Review of Systems    Objective   Physical Exam  Vitals and nursing note reviewed.   Constitutional:       General: She is not in acute distress.     Appearance: Normal appearance. She is well-developed.   Eyes:      General: Lids are normal.      Extraocular Movements: Extraocular movements intact.      Conjunctiva/sclera:      Right eye: Right conjunctiva is not injected. No exudate or hemorrhage.     Left eye: Left conjunctiva is injected. No exudate or hemorrhage.  Cardiovascular:      Rate and Rhythm: Normal rate and regular rhythm.      Heart sounds: Normal heart sounds.   Pulmonary:      Effort: Pulmonary effort is normal.      Breath sounds: Normal breath sounds.   Neurological:      Mental Status: She is alert and oriented to person, place, and time.   Psychiatric:         Mood and Affect: Mood normal.         Behavior: Behavior normal.         Thought Content: Thought content normal.         Judgment: Judgment normal.         Assessment & Plan   Diagnoses and all orders for this visit:    1. Acute bacterial conjunctivitis of left eye (Primary)  -     tobramycin (Tobrex) 0.3 % solution ophthalmic solution; Administer 2 drops into the left eye 3 times a day.  Dispense: 5 mL; Refill: 0    Discussed possible etiology, recommended cleaning wash cloths, pillow cases, washing hands any time she touches her eyes.  Will use tobrex, preg B, TID.  She will call with any changes or progression but we did discuss this could move to the left eye

## 2025-07-03 ENCOUNTER — ROUTINE PRENATAL (OUTPATIENT)
Dept: OBSTETRICS AND GYNECOLOGY | Facility: CLINIC | Age: 35
End: 2025-07-03
Payer: OTHER GOVERNMENT

## 2025-07-03 VITALS — WEIGHT: 213.6 LBS | BODY MASS INDEX: 34.49 KG/M2 | DIASTOLIC BLOOD PRESSURE: 76 MMHG | SYSTOLIC BLOOD PRESSURE: 108 MMHG

## 2025-07-03 DIAGNOSIS — O09.523 MULTIGRAVIDA OF ADVANCED MATERNAL AGE IN THIRD TRIMESTER: ICD-10-CM

## 2025-07-03 DIAGNOSIS — O26.899 RH NEGATIVE, ANTEPARTUM: ICD-10-CM

## 2025-07-03 DIAGNOSIS — Z34.82 MULTIGRAVIDA IN SECOND TRIMESTER: Primary | ICD-10-CM

## 2025-07-03 DIAGNOSIS — B00.9 HSV INFECTION: ICD-10-CM

## 2025-07-03 DIAGNOSIS — Z67.91 RH NEGATIVE, ANTEPARTUM: ICD-10-CM

## 2025-07-03 PROBLEM — Z34.92 SECOND TRIMESTER PREGNANCY: Status: RESOLVED | Noted: 2025-06-05 | Resolved: 2025-07-03

## 2025-07-03 LAB
ERYTHROCYTE [DISTWIDTH] IN BLOOD BY AUTOMATED COUNT: 12.4 % (ref 12.3–15.4)
GLUCOSE 1H P 50 G GLC PO SERPL-MCNC: 72 MG/DL (ref 65–139)
GLUCOSE UR STRIP-MCNC: NEGATIVE MG/DL
HCT VFR BLD AUTO: 36.8 % (ref 34–46.6)
HGB BLD-MCNC: 12.6 G/DL (ref 12–15.9)
MCH RBC QN AUTO: 33.9 PG (ref 26.6–33)
MCHC RBC AUTO-ENTMCNC: 34.2 G/DL (ref 31.5–35.7)
MCV RBC AUTO: 98.9 FL (ref 79–97)
PLATELET # BLD AUTO: 184 10*3/MM3 (ref 140–450)
PROT UR STRIP-MCNC: NEGATIVE MG/DL
RBC # BLD AUTO: 3.72 10*6/MM3 (ref 3.77–5.28)
WBC # BLD AUTO: 7.58 10*3/MM3 (ref 3.4–10.8)

## 2025-07-03 NOTE — PROGRESS NOTES
OB FOLLOW UP  CC- Here for care of pregnancy        Renetta Garrido is a 35 y.o.  28w0d patient being seen today for her obstetrical follow up. Patient reports no complaints.     Patient undergoing Glucola testing today. She is due for her testing at 11:25 AM.       MBT: O-  Rhogam: will be given today.  28 week packet: reviewed with patient   TDAP: given today   Ultrasound Today: No  Does patient need tubal consent or  consent signed? no    Her prenatal care is complicated by (and status) : see below.  Patient Active Problem List   Diagnosis    Closed displaced fracture of head of right radius    Injury of triangular fibrocartilage complex of right wrist    Sprain of anterior talofibular ligament of left ankle    HSV infection    Rh negative, antepartum    AMA (advanced maternal age) multigravida 35+    Multigravida in second trimester         ROS -   Patient Denies: Loss of Fluid, Vaginal Spotting, Vision Changes, Headaches, Nausea , Vomiting , Contractions, Epigastric pain, and skin itching  Fetal Movement : normal  Other than what is documented in the HPI, all other systems reviewed and are negative.     The additional following portions of the patient's history were reviewed and updated as appropriate: allergies, current medications, past family history, past medical history, past social history, past surgical history, and problem list.    I have reviewed and agree with the HPI, ROS, and historical information as entered above. Osiris Rubi MD      /76   Wt 96.9 kg (213 lb 9.6 oz)   LMP 2024   BMI 34.49 kg/m²         EXAM:     Prenatal Vitals  BP: 108/76  Weight: 96.9 kg (213 lb 9.6 oz)   Fetal Heart Rate: pos      Fundal Height (cm): 30 cm        Urine Glucose Read-only: Negative  Urine Protein Read-only: Negative         Assessment and Plan    Problem List Items Addressed This Visit       HSV infection    Overview   On suppression as of today 2/15/24         Relevant  Medications    valACYclovir (Valtrex) 1000 MG tablet    Rh negative, antepartum    Relevant Orders    CBC (No Diff)    Gestational Screen 1 Hr ([facility])    Antibody Screen    RPR, Rfx Qn RPR / Confirm TP    AMA (advanced maternal age) multigravida 35+    Relevant Orders    CBC (No Diff)    Gestational Screen 1 Hr ([facility])    Antibody Screen    RPR, Rfx Qn RPR / Confirm TP    Multigravida in second trimester - Primary    Relevant Orders    CBC (No Diff)    Gestational Screen 1 Hr ([facility])    Antibody Screen    RPR, Rfx Qn RPR / Confirm TP    POC Urinalysis Dipstick (Completed)       Pregnancy at 28w0d  1 hr Glucola, CBC, RPR. Antibody screen and TDAP today  Fetal movement/PTL or Labor precautions  Patient is on Prenatal vitamins  Reviewed Pre-eclampsia signs/symptoms  Activity and Exercise discussed.  Return in about 4 weeks (around 7/31/2025) for Next scheduled follow up.        Osiris Rubi MD  07/03/2025

## 2025-07-04 LAB
BLD GP AB SCN SERPL QL: NEGATIVE
OBSERVATION IMP: NORMAL
RPR SER QL: NON REACTIVE

## 2025-07-06 ENCOUNTER — HOSPITAL ENCOUNTER (INPATIENT)
Facility: HOSPITAL | Age: 35
LOS: 4 days | Discharge: HOME | End: 2025-07-10
Attending: OBSTETRICS & GYNECOLOGY | Admitting: OBSTETRICS & GYNECOLOGY
Payer: OTHER GOVERNMENT

## 2025-07-06 PROBLEM — N93.9 VAGINAL BLEEDING: Status: ACTIVE | Noted: 2025-07-06

## 2025-07-06 LAB
ABO GROUP BLD: NORMAL
ALP SERPL-CCNC: 57 U/L (ref 39–117)
ALT SERPL W P-5'-P-CCNC: 10 U/L (ref 1–33)
AST SERPL-CCNC: 14 U/L (ref 1–32)
BILIRUB SERPL-MCNC: 0.2 MG/DL (ref 0–1.2)
BLD GP AB SCN SERPL QL: POSITIVE
CREAT SERPL-MCNC: 0.48 MG/DL (ref 0.57–1)
DEPRECATED RDW RBC AUTO: 44.8 FL (ref 37–54)
ERYTHROCYTE [DISTWIDTH] IN BLOOD BY AUTOMATED COUNT: 12.8 % (ref 12.3–15.4)
FETAL BLEED: NEGATIVE
FETAL BLOOD VOL PATIENT KLEIH BETKE: 9.7 MLS
FETAL RBC/RBC BLD FC-RTO: 0.19 %
FIBRINOGEN PPP-MCNC: 323 MG/DL (ref 203–567)
HCT VFR BLD AUTO: 33.4 % (ref 34–46.6)
HGB BLD-MCNC: 11.9 G/DL (ref 12–15.9)
HGB F BLD QL KLEIH BETKE: POSITIVE
LDH SERPL-CCNC: 147 U/L (ref 135–214)
MCH RBC QN AUTO: 34.3 PG (ref 26.6–33)
MCHC RBC AUTO-ENTMCNC: 35.6 G/DL (ref 31.5–35.7)
MCV RBC AUTO: 96.3 FL (ref 79–97)
NUMBER OF DOSES: NORMAL
PLATELET # BLD AUTO: 165 10*3/MM3 (ref 140–450)
PMV BLD AUTO: 10.3 FL (ref 6–12)
RBC # BLD AUTO: 3.47 10*6/MM3 (ref 3.77–5.28)
RESIDUAL RHIG DETECTED: NORMAL
RH BLD: NEGATIVE
T&S EXPIRATION DATE: NORMAL
URATE SERPL-MCNC: 4.1 MG/DL (ref 2.4–5.7)
WBC NRBC COR # BLD AUTO: 8.17 10*3/MM3 (ref 3.4–10.8)

## 2025-07-06 PROCEDURE — 84075 ASSAY ALKALINE PHOSPHATASE: CPT | Performed by: OBSTETRICS & GYNECOLOGY

## 2025-07-06 PROCEDURE — 85027 COMPLETE CBC AUTOMATED: CPT | Performed by: OBSTETRICS & GYNECOLOGY

## 2025-07-06 PROCEDURE — 86850 RBC ANTIBODY SCREEN: CPT | Performed by: OBSTETRICS & GYNECOLOGY

## 2025-07-06 PROCEDURE — 85460 HEMOGLOBIN FETAL: CPT | Performed by: OBSTETRICS & GYNECOLOGY

## 2025-07-06 PROCEDURE — 99285 EMERGENCY DEPT VISIT HI MDM: CPT | Performed by: OBSTETRICS & GYNECOLOGY

## 2025-07-06 PROCEDURE — 86780 TREPONEMA PALLIDUM: CPT | Performed by: OBSTETRICS & GYNECOLOGY

## 2025-07-06 PROCEDURE — 99222 1ST HOSP IP/OBS MODERATE 55: CPT | Performed by: OBSTETRICS & GYNECOLOGY

## 2025-07-06 PROCEDURE — 83615 LACTATE (LD) (LDH) ENZYME: CPT | Performed by: OBSTETRICS & GYNECOLOGY

## 2025-07-06 PROCEDURE — 25010000002 BETAMETHASONE ACET & SOD PHOS PER 4 MG: Performed by: OBSTETRICS & GYNECOLOGY

## 2025-07-06 PROCEDURE — 59025 FETAL NON-STRESS TEST: CPT

## 2025-07-06 PROCEDURE — 86900 BLOOD TYPING SEROLOGIC ABO: CPT | Performed by: OBSTETRICS & GYNECOLOGY

## 2025-07-06 PROCEDURE — 82247 BILIRUBIN TOTAL: CPT | Performed by: OBSTETRICS & GYNECOLOGY

## 2025-07-06 PROCEDURE — 85384 FIBRINOGEN ACTIVITY: CPT | Performed by: OBSTETRICS & GYNECOLOGY

## 2025-07-06 PROCEDURE — 85461 HEMOGLOBIN FETAL: CPT | Performed by: OBSTETRICS & GYNECOLOGY

## 2025-07-06 PROCEDURE — 84460 ALANINE AMINO (ALT) (SGPT): CPT | Performed by: OBSTETRICS & GYNECOLOGY

## 2025-07-06 PROCEDURE — 82565 ASSAY OF CREATININE: CPT | Performed by: OBSTETRICS & GYNECOLOGY

## 2025-07-06 PROCEDURE — 59025 FETAL NON-STRESS TEST: CPT | Performed by: OBSTETRICS & GYNECOLOGY

## 2025-07-06 PROCEDURE — 84450 TRANSFERASE (AST) (SGOT): CPT | Performed by: OBSTETRICS & GYNECOLOGY

## 2025-07-06 PROCEDURE — 86920 COMPATIBILITY TEST SPIN: CPT

## 2025-07-06 PROCEDURE — 86870 RBC ANTIBODY IDENTIFICATION: CPT | Performed by: OBSTETRICS & GYNECOLOGY

## 2025-07-06 PROCEDURE — 86922 COMPATIBILITY TEST ANTIGLOB: CPT

## 2025-07-06 PROCEDURE — 84550 ASSAY OF BLOOD/URIC ACID: CPT | Performed by: OBSTETRICS & GYNECOLOGY

## 2025-07-06 PROCEDURE — 86901 BLOOD TYPING SEROLOGIC RH(D): CPT | Performed by: OBSTETRICS & GYNECOLOGY

## 2025-07-06 RX ORDER — ONDANSETRON 4 MG/1
8 TABLET, ORALLY DISINTEGRATING ORAL EVERY 8 HOURS PRN
Status: DISCONTINUED | OUTPATIENT
Start: 2025-07-06 | End: 2025-07-10 | Stop reason: HOSPADM

## 2025-07-06 RX ORDER — ONDANSETRON 2 MG/ML
4 INJECTION INTRAMUSCULAR; INTRAVENOUS EVERY 8 HOURS PRN
Status: DISCONTINUED | OUTPATIENT
Start: 2025-07-06 | End: 2025-07-10 | Stop reason: HOSPADM

## 2025-07-06 RX ORDER — VALACYCLOVIR HYDROCHLORIDE 500 MG/1
1000 TABLET, FILM COATED ORAL
Status: DISCONTINUED | OUTPATIENT
Start: 2025-07-06 | End: 2025-07-10 | Stop reason: HOSPADM

## 2025-07-06 RX ORDER — BETAMETHASONE SODIUM PHOSPHATE AND BETAMETHASONE ACETATE 3; 3 MG/ML; MG/ML
12 INJECTION, SUSPENSION INTRA-ARTICULAR; INTRALESIONAL; INTRAMUSCULAR; SOFT TISSUE EVERY 24 HOURS
Status: COMPLETED | OUTPATIENT
Start: 2025-07-06 | End: 2025-07-07

## 2025-07-06 RX ORDER — SODIUM CHLORIDE 0.9 % (FLUSH) 0.9 %
10 SYRINGE (ML) INJECTION AS NEEDED
Status: DISCONTINUED | OUTPATIENT
Start: 2025-07-06 | End: 2025-07-10 | Stop reason: HOSPADM

## 2025-07-06 RX ORDER — ACETAMINOPHEN 325 MG/1
650 TABLET ORAL EVERY 4 HOURS PRN
Status: DISCONTINUED | OUTPATIENT
Start: 2025-07-06 | End: 2025-07-10 | Stop reason: HOSPADM

## 2025-07-06 RX ORDER — DEXTROSE AND SODIUM CHLORIDE 5; .2 G/100ML; G/100ML
100 INJECTION, SOLUTION INTRAVENOUS CONTINUOUS
Status: ACTIVE | OUTPATIENT
Start: 2025-07-06 | End: 2025-07-06

## 2025-07-06 RX ORDER — LANOLIN ALCOHOL/MO/W.PET/CERES
200 CREAM (GRAM) TOPICAL DAILY
COMMUNITY

## 2025-07-06 RX ORDER — MAGNESIUM SULFATE HEPTAHYDRATE 40 MG/ML
2 INJECTION, SOLUTION INTRAVENOUS CONTINUOUS
Status: DISPENSED | OUTPATIENT
Start: 2025-07-06 | End: 2025-07-08

## 2025-07-06 RX ORDER — LIDOCAINE HYDROCHLORIDE 10 MG/ML
0.5 INJECTION, SOLUTION EPIDURAL; INFILTRATION; INTRACAUDAL; PERINEURAL ONCE AS NEEDED
Status: DISCONTINUED | OUTPATIENT
Start: 2025-07-06 | End: 2025-07-10 | Stop reason: HOSPADM

## 2025-07-06 RX ORDER — CALCIUM GLUCONATE 98 MG/ML
1 INJECTION, SOLUTION INTRAVENOUS AS NEEDED
Status: ACTIVE | OUTPATIENT
Start: 2025-07-06 | End: 2025-07-08

## 2025-07-06 RX ORDER — SODIUM CHLORIDE, SODIUM LACTATE, POTASSIUM CHLORIDE, CALCIUM CHLORIDE 600; 310; 30; 20 MG/100ML; MG/100ML; MG/100ML; MG/100ML
75 INJECTION, SOLUTION INTRAVENOUS CONTINUOUS
Status: DISCONTINUED | OUTPATIENT
Start: 2025-07-06 | End: 2025-07-06

## 2025-07-06 RX ORDER — SODIUM CHLORIDE 9 MG/ML
40 INJECTION, SOLUTION INTRAVENOUS AS NEEDED
Status: DISCONTINUED | OUTPATIENT
Start: 2025-07-06 | End: 2025-07-10 | Stop reason: HOSPADM

## 2025-07-06 RX ORDER — BISACODYL 10 MG
10 SUPPOSITORY, RECTAL RECTAL DAILY PRN
Status: DISCONTINUED | OUTPATIENT
Start: 2025-07-06 | End: 2025-07-10 | Stop reason: HOSPADM

## 2025-07-06 RX ORDER — SODIUM CHLORIDE 0.9 % (FLUSH) 0.9 %
10 SYRINGE (ML) INJECTION EVERY 12 HOURS SCHEDULED
Status: DISCONTINUED | OUTPATIENT
Start: 2025-07-06 | End: 2025-07-10 | Stop reason: HOSPADM

## 2025-07-06 RX ORDER — DOCUSATE SODIUM 100 MG/1
100 CAPSULE, LIQUID FILLED ORAL 2 TIMES DAILY PRN
Status: DISCONTINUED | OUTPATIENT
Start: 2025-07-06 | End: 2025-07-10 | Stop reason: HOSPADM

## 2025-07-06 RX ADMIN — DEXTROSE AND SODIUM CHLORIDE 100 ML/HR: 5; 200 INJECTION, SOLUTION INTRAVENOUS at 20:03

## 2025-07-06 RX ADMIN — BETAMETHASONE ACETATE AND BETAMETHASONE SODIUM PHOSPHATE 12 MG: 3; 3 INJECTION, SUSPENSION INTRA-ARTICULAR; INTRALESIONAL; INTRAMUSCULAR; SOFT TISSUE at 19:51

## 2025-07-06 NOTE — H&P
"[facility]  Obstetric History and Physical    Referring Provider: Osiris Rubi*      Chief Complaint   Patient presents with    Vaginal Bleeding       Subjective     Patient is a 35 y.o. female  currently at 28w3d, who presents with vaginal bleeding.  Patient reports she was around and felt leaking of fluid noted to be bright red vaginal bleeding.  Shortly after that patient developed abdominal cramping.  Patient denies recent trauma, intercourse, urinary symptoms, any other concerns.  Patient reports normal fetal activity.   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  Anatomy scan revealed no evidence of placenta previa        The following portions of the patients history were reviewed and updated as appropriate: current medications, allergies, past medical history, past surgical history, past family history, past social history, and problem list .       Prenatal Information:   Maternal Prenatal Labs  Blood Type No results found for: \"ABO\"   Rh Status No results found for: \"RH\"   Antibody Screen No results found for: \"ABSCRN\"   Gonnorhea No results found for: \"GCCX\"   Chlamydia No results found for: \"CLAMYDCU\"   RPR No results found for: \"RPR\"   Syphilis Antibody No results found for: \"SYPHILIS\"   Rubella No results found for: \"RUBELLAIGGIN\"   Hepatitis B Surface Antigen No results found for: \"HEPBSAG\"   HIV-1 Antibody No results found for: \"LABHIV1\"   Hepatitis C Antibody No results found for: \"HEPCAB\"   Rapid Urin Drug Screen No results found for: \"AMPMETHU\", \"BARBITSCNUR\", \"LABBENZSCN\", \"LABMETHSCN\", \"LABOPIASCN\", \"THCURSCR\", \"COCAINEUR\", \"AMPHETSCREEN\", \"PROPOXSCN\", \"BUPRENORSCNU\", \"METAMPSCNUR\", \"OXYCODONESCN\", \"TRICYCLICSCN\"   Group B Strep Culture No results found for: \"GBSANTIGEN\"           External Prenatal Results       Pregnancy Outside Results - Transcribed From Office Records - See Scanned Records For Details       Test " Value Date Time    ABO  O  03/15/25 1133    Rh  Negative  03/15/25 1133    Antibody Screen  Negative  25 1129       Negative  25    Varicella IgG       Rubella  <0.90 index 25    Hgb  12.6 g/dL 25 1129       13.2 g/dL 03/15/25 1122       13.6 g/dL 25    Hct  36.8 % 25 1129       37.2 % 03/15/25 1122       40.5 % 25 09    HgB A1c        1h GTT  72 mg/dL 25 1129    3h GTT Fasting       3h GTT 1 hour       3h GTT 2 hour       3h GTT 3 hour        Gonorrhea (discrete)  Negative  25    Chlamydia (discrete)  Negative  25    RPR  Non Reactive  25 112       Non Reactive  25    Syphils cascade: TP-Ab (FTA)       TP-Ab       TP-Ab (EIA)       TPPA       HBsAg  Negative  25    Herpes Simplex Virus PCR       Herpes Simplex VIrus Culture       HIV  Non Reactive  25    Hep C RNA Quant PCR       Hep C Antibody  Non Reactive  25    AFP       NIPT       Cystic Fibrosis ([place])       Cystic Fibroisis        Spinal Muscular atrophy       Fragile X       Group B Strep       GBS Susceptibility to Clindamycin       GBS Susceptibility to Erythromycin       Fetal Fibronectin       Genetic Testing, Maternal Blood                 Drug Screening       Test Value Date Time    Urine Drug Screen       Amphetamine Screen  Negative ng/mL 25    Barbiturate Screen  Negative ng/mL 25    Benzodiazepine Screen  Negative ng/mL 25    Methadone Screen  Negative ng/mL 25    Phencyclidine Screen  Negative ng/mL 25    Opiates Screen       THC Screen       Cocaine Screen       Propoxyphene Screen  Negative ng/mL 25    Buprenorphine Screen       Methamphetamine Screen       Oxycodone Screen       Tricyclic Antidepressants Screen                 Legend    ^: Historical                              Past OB History:       OB History    Para Term   AB Living   7 3 3 0 3 3   SAB IAB Ectopic Molar Multiple Live Births   3 0 0 0 0 3      # Outcome Date GA Lbr Mitul/2nd Weight Sex Type Anes PTL Lv   7 Current            6 Term  39w0d    Vag-Spont   BRENNAN   5 Term  40w0d    Vag-Spont   BRENNAN   4 Term  40w0d    Vag-Spont   BRENNAN   3 SAB            2 SAB            1 SAB                Past Medical History: Past Medical History:   Diagnosis Date    Abnormal Pap smear of cervix     ADHD (attention deficit hyperactivity disorder) 2008    Haven’t taken meds for it since 2016    Anemia     Had to be put on iron in college and then slow fe during pregnancies    Elbow fracture, right     Female infertility     Herpes     Hsv1    Multiple gestation     Ovarian cyst       Past Surgical History Past Surgical History:   Procedure Laterality Date    COLONOSCOPY  09/2024    HYSTEROSCOPY  05/2018    hysteroscopic metroplasty Semi septate uterus    UTERINE SEPTUM REMOVAL  2017    semi septate      Family History: Family History   Problem Relation Age of Onset    Obesity Father     Cancer Father         Colon cancer    Colon cancer Father     Hypertension Mother     Cancer Paternal Grandmother         My gpa had thyroid cancer      Social History:  reports that she quit smoking about 11 years ago. Her smoking use included cigarettes. She started smoking about 17 years ago. She has a 1.5 pack-year smoking history. She has never used smokeless tobacco.   reports that she does not currently use alcohol after a past usage of about 1.0 - 2.0 standard drink of alcohol per week.   reports no history of drug use.                   General ROS Negative Findings:Headaches, Visual Changes, Epigastric pain, Anorexia, Nausia/Vomiting, and ROM    ROS     All other systems have been reviewed and are neg  Objective       Vital Signs Range for the last 24 hours  Temperature: Temp:  [98.3 °F (36.8 °C)] 98.3 °F (36.8 °C)   Temp Source: Temp src: Oral   BP: BP: (113)/(77) 113/77   Pulse: Heart Rate:  [69] 69    Respirations: Resp:  [18] 18   SPO2: SpO2:  [97 %] 97 %   O2 Amount (l/min):     O2 Devices Device (Oxygen Therapy): room air   Weight: Weight:  [96.6 kg (213 lb)] 96.6 kg (213 lb)     Physical Examination:   General:   alert, appears stated age, and cooperative   Skin:   normal   HEENT:     Lungs:   clear to auscultation bilaterally   Heart:   regular rate and rhythm, S1, S2 normal, no murmur, click, rub or gallop   Gastrointestinal: Soft, gravid uterus, guarding benign exam   Lower Extremities: No edema, no calf tenderness   : Sterile speculum: Cervix visualized.  Closed blood noted at the cervix.  Bright red blood noted in on the cervix.  In the posterior fornix.  No cervical lesion noted.   Musculoskeletal:     Neuro:           Presentation: vtx   Cervix: Exam by:  Hattie   Dilation:  Closed    Effacement:  TH   Station:  NE  Bright red blood noted on glove.       Fetal Heart Rate Assessment   Method:     Beats/min:     Baseline:     Varibility:     Accels:     Decels:     Tracing Category:     NST-indications vaginal bleeding, interpretation reactive, moderate variability, accelerations present 10 x 10, no fetal deceleration noted, onset 1800 and time 1900, no regular contractions noted.  Uterine Assessment   Method:     Frequency (min):     Ctx Count in 10 min:     Duration:     Intensity:     Intensity by IUPC:     Resting Tone:     Resting Tone by IUPC:     [place] Units:       Laboratory Results:   Lab Results (last 24 hours)       ** No results found for the last 24 hours. **          Radiology Review:   Imaging Results (Last 24 Hours)       ** No results found for the last 24 hours. **          Other Studies: Bedside ultrasound Viel single IUP vertex presentation, fetus active, grossly.  Amniotic fluid.  Anterior placenta, no evidence of placenta previa, small abruption noted anterior maternal right?     Assessment & Plan       Vaginal bleeding    Rh negative, antepartum    AMA (advanced maternal  age) multigravida 35+    Multigravida in second trimester        Assessment:  1.  Intrauterine pregnancy at 28w3d gestation with reactive fetal status.    2.  Acute onset vaginal bleeding (placental abruption)  3.  Rhesus negative  (RhoGAM given July 3)  4.  History of 3 term vaginal delivery  5.  History of genital HSV on suppression    Plan:  1.  Admit, magnesium sulfate toco lysis,  corticosteroids, continuous EFM, PDC ultrasound in a.m.   Rh workup for another dose of RhoGAM, Scuds,  DVT prophylaxis  2. Plan of care has been reviewed with patient.  3.  Risks, benefits of treatment plan have been discussed.  4.  All questions have been answered.  5  notify OB provider on-call of admission      Darian Kuhn DO  2025  18:54 EDT

## 2025-07-07 ENCOUNTER — APPOINTMENT (OUTPATIENT)
Dept: WOMENS IMAGING | Facility: HOSPITAL | Age: 35
End: 2025-07-07
Payer: OTHER GOVERNMENT

## 2025-07-07 LAB
DEPRECATED RDW RBC AUTO: 43.3 FL (ref 37–54)
ERYTHROCYTE [DISTWIDTH] IN BLOOD BY AUTOMATED COUNT: 12.7 % (ref 12.3–15.4)
FIBRINOGEN PPP-MCNC: 350 MG/DL (ref 203–567)
HCT VFR BLD AUTO: 34.7 % (ref 34–46.6)
HGB BLD-MCNC: 12.3 G/DL (ref 12–15.9)
MCH RBC QN AUTO: 33.7 PG (ref 26.6–33)
MCHC RBC AUTO-ENTMCNC: 35.4 G/DL (ref 31.5–35.7)
MCV RBC AUTO: 95.1 FL (ref 79–97)
PLATELET # BLD AUTO: 172 10*3/MM3 (ref 140–450)
PMV BLD AUTO: 10.1 FL (ref 6–12)
RBC # BLD AUTO: 3.65 10*6/MM3 (ref 3.77–5.28)
TREPONEMA PALLIDUM IGG+IGM AB [PRESENCE] IN SERUM OR PLASMA BY IMMUNOASSAY: NORMAL
WBC NRBC COR # BLD AUTO: 7.88 10*3/MM3 (ref 3.4–10.8)

## 2025-07-07 PROCEDURE — 3E0334Z INTRODUCTION OF SERUM, TOXOID AND VACCINE INTO PERIPHERAL VEIN, PERCUTANEOUS APPROACH: ICD-10-PCS | Performed by: OBSTETRICS & GYNECOLOGY

## 2025-07-07 PROCEDURE — 25010000002 BETAMETHASONE ACET & SOD PHOS PER 4 MG: Performed by: OBSTETRICS & GYNECOLOGY

## 2025-07-07 PROCEDURE — 25010000002 MAGNESIUM SULFATE 20 GM/500ML SOLUTION: Performed by: OBSTETRICS & GYNECOLOGY

## 2025-07-07 PROCEDURE — 25810000003 LACTATED RINGERS PER 1000 ML: Performed by: OBSTETRICS & GYNECOLOGY

## 2025-07-07 PROCEDURE — 76811 OB US DETAILED SNGL FETUS: CPT

## 2025-07-07 PROCEDURE — 85027 COMPLETE CBC AUTOMATED: CPT | Performed by: OBSTETRICS & GYNECOLOGY

## 2025-07-07 PROCEDURE — 59025 FETAL NON-STRESS TEST: CPT

## 2025-07-07 PROCEDURE — 76811 OB US DETAILED SNGL FETUS: CPT | Performed by: OBSTETRICS & GYNECOLOGY

## 2025-07-07 PROCEDURE — 85384 FIBRINOGEN ACTIVITY: CPT | Performed by: OBSTETRICS & GYNECOLOGY

## 2025-07-07 PROCEDURE — 25010000002 RHO D IMMUNE GLOBULIN 1500 UNIT/2ML SOLUTION PREFILLED SYRINGE: Performed by: OBSTETRICS & GYNECOLOGY

## 2025-07-07 RX ORDER — SODIUM CHLORIDE, SODIUM LACTATE, POTASSIUM CHLORIDE, CALCIUM CHLORIDE 600; 310; 30; 20 MG/100ML; MG/100ML; MG/100ML; MG/100ML
75 INJECTION, SOLUTION INTRAVENOUS CONTINUOUS
Status: DISCONTINUED | OUTPATIENT
Start: 2025-07-07 | End: 2025-07-09

## 2025-07-07 RX ORDER — PRENATAL VIT/IRON FUM/FOLIC AC 27MG-0.8MG
1 TABLET ORAL DAILY
Status: DISCONTINUED | OUTPATIENT
Start: 2025-07-07 | End: 2025-07-10 | Stop reason: HOSPADM

## 2025-07-07 RX ORDER — ZOLPIDEM TARTRATE 5 MG/1
5 TABLET ORAL NIGHTLY PRN
Status: DISCONTINUED | OUTPATIENT
Start: 2025-07-07 | End: 2025-07-10 | Stop reason: HOSPADM

## 2025-07-07 RX ADMIN — ACETAMINOPHEN 650 MG: 325 TABLET ORAL at 08:28

## 2025-07-07 RX ADMIN — BETAMETHASONE ACETATE AND BETAMETHASONE SODIUM PHOSPHATE 12 MG: 3; 3 INJECTION, SUSPENSION INTRA-ARTICULAR; INTRALESIONAL; INTRAMUSCULAR; SOFT TISSUE at 19:50

## 2025-07-07 RX ADMIN — Medication 10 ML: at 08:33

## 2025-07-07 RX ADMIN — PRENATAL VITAMINS-IRON FUMARATE 27 MG IRON-FOLIC ACID 0.8 MG TABLET 1 TABLET: at 14:37

## 2025-07-07 RX ADMIN — VALACYCLOVIR 1000 MG: 500 TABLET, FILM COATED ORAL at 08:31

## 2025-07-07 RX ADMIN — SODIUM CHLORIDE, POTASSIUM CHLORIDE, SODIUM LACTATE AND CALCIUM CHLORIDE 75 ML/HR: 600; 310; 30; 20 INJECTION, SOLUTION INTRAVENOUS at 08:35

## 2025-07-07 RX ADMIN — ZOLPIDEM TARTRATE 5 MG: 5 TABLET ORAL at 00:56

## 2025-07-07 RX ADMIN — SODIUM CHLORIDE, POTASSIUM CHLORIDE, SODIUM LACTATE AND CALCIUM CHLORIDE 75 ML/HR: 600; 310; 30; 20 INJECTION, SOLUTION INTRAVENOUS at 21:14

## 2025-07-07 RX ADMIN — HUMAN RHO(D) IMMUNE GLOBULIN 1500 UNITS: 1500 SOLUTION INTRAMUSCULAR; INTRAVENOUS at 15:39

## 2025-07-07 RX ADMIN — MAGNESIUM SULFATE HEPTAHYDRATE 2 G/HR: 40 INJECTION, SOLUTION INTRAVENOUS at 04:40

## 2025-07-07 RX ADMIN — DOCUSATE SODIUM 100 MG: 100 CAPSULE, LIQUID FILLED ORAL at 15:50

## 2025-07-07 RX ADMIN — ACETAMINOPHEN 650 MG: 325 TABLET ORAL at 14:45

## 2025-07-08 PROCEDURE — 59025 FETAL NON-STRESS TEST: CPT

## 2025-07-08 PROCEDURE — 25810000003 LACTATED RINGERS PER 1000 ML: Performed by: OBSTETRICS & GYNECOLOGY

## 2025-07-08 PROCEDURE — 25010000002 MAGNESIUM SULFATE 20 GM/500ML SOLUTION: Performed by: OBSTETRICS & GYNECOLOGY

## 2025-07-08 RX ORDER — POLYETHYLENE GLYCOL 3350 17 G/17G
17 POWDER, FOR SOLUTION ORAL DAILY
Status: DISCONTINUED | OUTPATIENT
Start: 2025-07-08 | End: 2025-07-10 | Stop reason: HOSPADM

## 2025-07-08 RX ADMIN — Medication 10 ML: at 21:28

## 2025-07-08 RX ADMIN — PRENATAL VITAMINS-IRON FUMARATE 27 MG IRON-FOLIC ACID 0.8 MG TABLET 1 TABLET: at 08:38

## 2025-07-08 RX ADMIN — MAGNESIUM SULFATE HEPTAHYDRATE 2 G/HR: 40 INJECTION, SOLUTION INTRAVENOUS at 11:00

## 2025-07-08 RX ADMIN — ACETAMINOPHEN 650 MG: 325 TABLET ORAL at 06:34

## 2025-07-08 RX ADMIN — MAGNESIUM SULFATE HEPTAHYDRATE 2 G/HR: 40 INJECTION, SOLUTION INTRAVENOUS at 00:59

## 2025-07-08 RX ADMIN — DOCUSATE SODIUM 100 MG: 100 CAPSULE, LIQUID FILLED ORAL at 11:56

## 2025-07-08 RX ADMIN — Medication 10 ML: at 08:43

## 2025-07-08 RX ADMIN — ACETAMINOPHEN 650 MG: 325 TABLET ORAL at 11:57

## 2025-07-08 RX ADMIN — POLYETHYLENE GLYCOL 3350 17 G: 17 POWDER, FOR SOLUTION ORAL at 19:16

## 2025-07-08 RX ADMIN — DOCUSATE SODIUM 100 MG: 100 CAPSULE, LIQUID FILLED ORAL at 05:33

## 2025-07-08 RX ADMIN — VALACYCLOVIR 1000 MG: 500 TABLET, FILM COATED ORAL at 08:38

## 2025-07-08 RX ADMIN — SODIUM CHLORIDE, POTASSIUM CHLORIDE, SODIUM LACTATE AND CALCIUM CHLORIDE 75 ML/HR: 600; 310; 30; 20 INJECTION, SOLUTION INTRAVENOUS at 10:07

## 2025-07-08 NOTE — PROGRESS NOTES
Daily Progress Note    Patient name: Renetta Garrido  YOB: 1990   MRN: [id number]  Referring Provider: Osiris Rubi  Admission Date: 2025  Date of Service: 2025    Renetta Garrido is a 35 y.o.    at 28w4d  admitted on 2025 for Vaginal bleeding    Hospital day 1    Diagnoses:     Vaginal bleeding    Rh negative, antepartum    AMA (advanced maternal age) multigravida 35+    Multigravida in second trimester       Prenatal Labs  Lab Results   Component Value Date    HGB 12.3 2025    HEPBSAG Negative 2025    ABO O 2025    RH Negative 2025    ABSCRN Positive 2025    [id number] Non Reactive 2025    HEPCVIRUSABY Non Reactive 2025    URINECX 50,000 CFU/mL Normal Urogenital Connie 03/15/2025       Subjective:      Renetta has no new complaints today.  Reports fetal movement is normal  Rare ctxs  Denies leakage of amniotic fluid.  Reports vaginal bleeding, describes as much lighter now, brown discharge    Review of Systems - all other systems reviewed and negative    Objective:     Vital signs:  Vital Signs Range for the last 24 hours  Temperature: Temp:  [98 °F (36.7 °C)-98.6 °F (37 °C)] 98 °F (36.7 °C)   Temp Source: Temp src: Oral   BP: BP: ()/(57-74) 104/61   Pulse: Heart Rate:  [65-88] 75   Respirations: Resp:  [16-18] 16   Weight: 96.6 kg (213 lb)     General: Alert & oriented x4, in no apparent distress  HEENT: Grossly normal  Resp: Unlabored breathing  Abdomen: Soft, nontender  Uterus: Gravid, nontender  Extremities: Nontender, no pain, no edema   Neurologic:  Alert & oriented x 4,  no focal deficits noted  Psychiatric:  Speech and behavior appropriate     Non-Stress Test:  Reactive   Time: Continuous  Indication: marginal abruption    Uterine Assessment   Method: Method: external tocotransducer   Frequency (min):     Ctx Count in 10 min:     Duration:     Intensity: Contraction Intensity: no contractions   Intensity by IUPC:      Resting Tone: Uterine Resting Tone: soft by palpation   Resting Tone by IUPC:     Max Units:       Cervix: Exam by: Method: sterile vaginal exam performed   Dilation:     Effacement:     Station:         Most recent ultrasound:  [facility] this AM: Transverse baby but Funic presentation, Area of small abruption 2.3 x 0.6cm.  Normal dopplers  EFW 17%ile: 2lb 8oz; 1144gm      Medications:  prenatal vitamin, 1 tablet, Oral, Daily  sodium chloride, 10 mL, Intravenous, Q12H  valACYclovir, 1,000 mg, Oral, Q24H         acetaminophen    bisacodyl    calcium gluconate    docusate sodium    lidocaine PF 1%    ondansetron ODT **OR** ondansetron    sodium chloride    sodium chloride    zolpidem    Labs:  Lab Results   Component Value Date    WBC 7.88 2025    HGB 12.3 2025    HCT 34.7 2025    MCV 95.1 2025     2025    CREATININE 0.48 (L) 2025    URICACID 4.1 2025    AST 14 2025    ALT 10 2025     2025     Results from last 7 days   Lab Units 25  1913   ABO TYPING  O   RH TYPING  Negative   ANTIBODY SCREEN  Positive       Assessment/Plan:      25   Renetta is a 35 y.o.    at 28w4d with Vaginal bleeding     Medical Problems       Hospital Problem List       * (Principal) Vaginal bleeding    Rh negative, antepartum    AMA (advanced maternal age) multigravida 35+    Multigravida in second trimester        Cont ANCS's - BMZ #2 at 1951  Cont Magnesium until steroid window  VTE prophylaxis, SCDs    D/w patient and answered all questions to the best of my ability.      Osiris Rubi MD  2025 20:16 EDT

## 2025-07-08 NOTE — PAYOR COMM NOTE
"Renetta Garrido (35 y.o. Female)   [facility] NPI  [id number]  Osiris Ga MD  NPI  [id number]      Date of Birth   1990    Social Security Number       Address   [address] [facility] [address]    Home Phone       MRN   [id number]       Hinduism   Tenriism    Marital Status                               Admission Date   7/6/2025    Admission Type   Emergency    Admitting Provider   Osiris Rubi MD    Attending Provider   Osiris Rubi MD    Department, Room/Bed   [facility] ANTEPARTUM, N327/1       Discharge Date       Discharge Disposition       Discharge Destination                                 Attending Provider: Osiris Rubi MD    Allergies: No Known Allergies    Isolation: None   Infection: None   Code Status: CPR    Ht: 167.6 cm (66\")   Wt: 96.6 kg (213 lb)    Admission Cmt: None   Principal Problem: Vaginal bleeding [N93.9]                   Active Insurance as of 7/6/2025       Primary Coverage       Payor Plan Insurance Group Employer/Plan Group      [employer]        Payor Plan Address Payor Plan Phone Number Payor Plan Fax Number Effective Dates    [address] 9/18/2023 - None Entered    ATTN: NEW CLAIMS       [facility] [id number]         Subscriber Name Subscriber Birth Date Member ID       [id number] LIZET [birth year]/24/1992 [id number]                     Emergency Contacts        (Rel.) Home Phone Work Phone Mobile Phone    KAVEH,SUKH (Spouse) -- -- [phone]              Insurance Information                  / [employer] Phone: --    Subscriber: Sukh Garrido Subscriber#: [id number]    Group#: -- Precert#: --    Authorization#: -- Effective Date: --             History & Physical        Darian Kuhn DO at 07/06/25 1853          [facility]  Obstetric History and Physical    Referring Provider: Osiris Rubi*      Chief Complaint " "  Patient presents with    Vaginal Bleeding       Subjective    Patient is a 35 y.o. female  currently at 28w3d, who presents with vaginal bleeding.  Patient reports she was around and felt leaking of fluid noted to be bright red vaginal bleeding.  Shortly after that patient developed abdominal cramping.  Patient denies recent trauma, intercourse, urinary symptoms, any other concerns.  Patient reports normal fetal activity.   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  Anatomy scan revealed no evidence of placenta previa        The following portions of the patients history were reviewed and updated as appropriate: current medications, allergies, past medical history, past surgical history, past family history, past social history, and problem list .       Prenatal Information:   Maternal Prenatal Labs  Blood Type No results found for: \"ABO\"   Rh Status No results found for: \"RH\"   Antibody Screen No results found for: \"ABSCRN\"   Gonnorhea No results found for: \"GCCX\"   Chlamydia No results found for: \"CLAMYDCU\"   RPR No results found for: \"RPR\"   Syphilis Antibody No results found for: \"SYPHILIS\"   Rubella No results found for: \"RUBELLAIGGIN\"   Hepatitis B Surface Antigen No results found for: \"HEPBSAG\"   HIV-1 Antibody No results found for: \"LABHIV1\"   Hepatitis C Antibody No results found for: \"HEPCAB\"   Rapid Urin Drug Screen No results found for: \"AMPMETHU\", \"BARBITSCNUR\", \"LABBENZSCN\", \"LABMETHSCN\", \"LABOPIASCN\", \"THCURSCR\", \"COCAINEUR\", \"AMPHETSCREEN\", \"PROPOXSCN\", \"BUPRENORSCNU\", \"METAMPSCNUR\", \"OXYCODONESCN\", \"TRICYCLICSCN\"   Group B Strep Culture No results found for: \"GBSANTIGEN\"           External Prenatal Results       Pregnancy Outside Results - Transcribed From Office Records - See Scanned Records For Details       Test Value Date Time    ABO  O  03/15/25 1133    Rh  Negative  03/15/25 1133    Antibody Screen  Negative  25 1129     "   Negative  25    Varicella IgG       Rubella  <0.90 index 25    Hgb  12.6 g/dL 25 1129       13.2 g/dL 03/15/25 112       13.6 g/dL 25    Hct  36.8 % 25 1129       37.2 % 03/15/25 1122       40.5 % 25    HgB A1c        1h GTT  72 mg/dL 25 112    3h GTT Fasting       3h GTT 1 hour       3h GTT 2 hour       3h GTT 3 hour        Gonorrhea (discrete)  Negative  25    Chlamydia (discrete)  Negative  25    RPR  Non Reactive  25       Non Reactive  25    Syphils cascade: TP-Ab (FTA)       TP-Ab       TP-Ab (EIA)       TPPA       HBsAg  Negative  25    Herpes Simplex Virus PCR       Herpes Simplex VIrus Culture       HIV  Non Reactive  25    Hep C RNA Quant PCR       Hep C Antibody  Non Reactive  25    AFP       NIPT       Cystic Fibrosis (Sylvia)       Cystic Fibroisis        Spinal Muscular atrophy       Fragile X       Group B Strep       GBS Susceptibility to Clindamycin       GBS Susceptibility to Erythromycin       Fetal Fibronectin       Genetic Testing, Maternal Blood                 Drug Screening       Test Value Date Time    Urine Drug Screen       Amphetamine Screen  Negative ng/mL 25    Barbiturate Screen  Negative ng/mL 25    Benzodiazepine Screen  Negative ng/mL 25    Methadone Screen  Negative ng/mL 25    Phencyclidine Screen  Negative ng/mL 25    Opiates Screen       THC Screen       Cocaine Screen       Propoxyphene Screen  Negative ng/mL 25    Buprenorphine Screen       Methamphetamine Screen       Oxycodone Screen       Tricyclic Antidepressants Screen                 Legend    ^: Historical                              Past OB History:       OB History    Para Term  AB Living   7 3 3 0 3 3   SAB IAB Ectopic Molar Multiple Live Births   3 0 0 0 0 3      # Outcome Date GA Lbr Mitul/2nd  Weight Sex Type Anes PTL Lv   7 Current            6 Term  39w0d    Vag-Spont   BRENNAN   5 Term  40w0d    Vag-Spont   BRENNAN   4 Term  40w0d    Vag-Spont   BRENNAN   3 SAB            2 SAB            1 SAB                Past Medical History: Past Medical History:   Diagnosis Date    Abnormal Pap smear of cervix     ADHD (attention deficit hyperactivity disorder) 2008    Haven’t taken meds for it since 2016    Anemia     Had to be put on iron in college and then slow fe during pregnancies    Elbow fracture, right     Female infertility     Herpes     Hsv1    Multiple gestation     Ovarian cyst       Past Surgical History Past Surgical History:   Procedure Laterality Date    COLONOSCOPY  09/2024    HYSTEROSCOPY  05/2018    hysteroscopic metroplasty Semi septate uterus    UTERINE SEPTUM REMOVAL  2017    semi septate      Family History: Family History   Problem Relation Age of Onset    Obesity Father     Cancer Father         Colon cancer    Colon cancer Father     Hypertension Mother     Cancer Paternal Grandmother         My gpa had thyroid cancer      Social History:  reports that she quit smoking about 11 years ago. Her smoking use included cigarettes. She started smoking about 17 years ago. She has a 1.5 pack-year smoking history. She has never used smokeless tobacco.   reports that she does not currently use alcohol after a past usage of about 1.0 - 2.0 standard drink of alcohol per week.   reports no history of drug use.                   General ROS Negative Findings:Headaches, Visual Changes, Epigastric pain, Anorexia, Nausia/Vomiting, and ROM    ROS     All other systems have been reviewed and are neg  Objective      Vital Signs Range for the last 24 hours  Temperature: Temp:  [98.3 °F (36.8 °C)] 98.3 °F (36.8 °C)   Temp Source: Temp src: Oral   BP: BP: (113)/(77) 113/77   Pulse: Heart Rate:  [69] 69   Respirations: Resp:  [18] 18   SPO2: SpO2:  [97 %] 97 %   O2 Amount (l/min):     O2 Devices Device (Oxygen Therapy):  room air   Weight: Weight:  [96.6 kg (213 lb)] 96.6 kg (213 lb)     Physical Examination:   General:   alert, appears stated age, and cooperative   Skin:   normal   HEENT:     Lungs:   clear to auscultation bilaterally   Heart:   regular rate and rhythm, S1, S2 normal, no murmur, click, rub or gallop   Gastrointestinal: Soft, gravid uterus, guarding benign exam   Lower Extremities: No edema, no calf tenderness   : Sterile speculum: Cervix visualized.  Closed blood noted at the cervix.  Bright red blood noted in on the cervix.  In the posterior fornix.  No cervical lesion noted.   Musculoskeletal:     Neuro:           Presentation: vtx   Cervix: Exam by:  Hattie   Dilation:  Closed    Effacement:  TH   Station:  NE  Bright red blood noted on glove.       Fetal Heart Rate Assessment   Method:     Beats/min:     Baseline:     Varibility:     Accels:     Decels:     Tracing Category:     NST-indications vaginal bleeding, interpretation reactive, moderate variability, accelerations present 10 x 10, no fetal deceleration noted, onset 1800 and time 1900, no regular contractions noted.  Uterine Assessment   Method:     Frequency (min):     Ctx Count in 10 min:     Duration:     Intensity:     Intensity by IUPC:     Resting Tone:     Resting Tone by IUPC:     [place] Units:       Laboratory Results:   Lab Results (last 24 hours)       ** No results found for the last 24 hours. **          Radiology Review:   Imaging Results (Last 24 Hours)       ** No results found for the last 24 hours. **          Other Studies: Bedside ultrasound Viel single IUP vertex presentation, fetus active, grossly.  Amniotic fluid.  Anterior placenta, no evidence of placenta previa, small abruption noted anterior maternal right?     Assessment & Plan      Vaginal bleeding    Rh negative, antepartum    AMA (advanced maternal age) multigravida 35+    Multigravida in second trimester        Assessment:  1.  Intrauterine pregnancy at 28w3d  gestation with reactive fetal status.    2.  Acute onset vaginal bleeding (placental abruption)  3.  Rhesus negative  (RhoGAM given July 3)  4.  History of 3 term vaginal delivery  5.  History of genital HSV on suppression    Plan:  1.  Admit, magnesium sulfate toco lysis,  corticosteroids, continuous EFM, PDC ultrasound in a.m.   Rh workup for another dose of RhoGAM, Scuds,  DVT prophylaxis  2. Plan of care has been reviewed with patient.  3.  Risks, benefits of treatment plan have been discussed.  4.  All questions have been answered.  5  notify OB provider on-call of admission      Darian Kuhn DO  2025  18:54 EDT    Electronically signed by Darian Kuhn DO at 25 1906          Physician Progress Notes (last 72 hours)        Osiris Rubi MD at 25 0900          Daily Progress Note    Patient name: Renetta Garrido  YOB: 1990   MRN: [id number]  Referring Provider: Osiris Rubi  Admission Date: 2025  Date of Service: 2025    Renetta Garrido is a 35 y.o.    at 28w4d  admitted on 2025 for Vaginal bleeding    Hospital day 1    Diagnoses:     Vaginal bleeding    Rh negative, antepartum    AMA (advanced maternal age) multigravida 35+    Multigravida in second trimester       Prenatal Labs  Lab Results   Component Value Date    HGB 12.3 2025    HEPBSAG Negative 2025    ABO O 2025    RH Negative 2025    ABSCRN Positive 2025    [id number] Non Reactive 2025    HEPCVIRUSABY Non Reactive 2025    URINECX 50,000 CFU/mL Normal Urogenital Connie 03/15/2025       Subjective:      Renetta has no new complaints today.  Reports fetal movement is normal  Rare ctxs  Denies leakage of amniotic fluid.  Reports vaginal bleeding, describes as much lighter now, brown discharge    Review of Systems - all other systems reviewed and negative    Objective:     Vital signs:  Vital Signs Range for the last  24 hours  Temperature: Temp:  [98 °F (36.7 °C)-98.6 °F (37 °C)] 98 °F (36.7 °C)   Temp Source: Temp src: Oral   BP: BP: ()/(57-74) 104/61   Pulse: Heart Rate:  [65-88] 75   Respirations: Resp:  [16-18] 16   Weight: 96.6 kg (213 lb)     General: Alert & oriented x4, in no apparent distress  HEENT: Grossly normal  Resp: Unlabored breathing  Abdomen: Soft, nontender  Uterus: Gravid, nontender  Extremities: Nontender, no pain, no edema   Neurologic:  Alert & oriented x 4,  no focal deficits noted  Psychiatric:  Speech and behavior appropriate     Non-Stress Test:  Reactive   Time: Continuous  Indication: marginal abruption    Uterine Assessment   Method: Method: external tocotransducer   Frequency (min):     Ctx Count in 10 min:     Duration:     Intensity: Contraction Intensity: no contractions   Intensity by IUPC:     Resting Tone: Uterine Resting Tone: soft by palpation   Resting Tone by IUPC:     [place] Units:       Cervix: Exam by: Method: sterile vaginal exam performed   Dilation:     Effacement:     Station:         Most recent ultrasound:  [facility] this AM: Transverse baby but Funic presentation, Area of small abruption 2.3 x 0.6cm.  Normal dopplers  EFW 17%ile: 2lb 8oz; 1144gm      Medications:  prenatal vitamin, 1 tablet, Oral, Daily  sodium chloride, 10 mL, Intravenous, Q12H  valACYclovir, 1,000 mg, Oral, Q24H         acetaminophen    bisacodyl    calcium gluconate    docusate sodium    lidocaine PF 1%    ondansetron ODT **OR** ondansetron    sodium chloride    sodium chloride    zolpidem    Labs:  Lab Results   Component Value Date    WBC 7.88 07/07/2025    HGB 12.3 07/07/2025    HCT 34.7 07/07/2025    MCV 95.1 07/07/2025     07/07/2025    CREATININE 0.48 (L) 07/06/2025    URICACID 4.1 07/06/2025    AST 14 07/06/2025    ALT 10 07/06/2025     07/06/2025     Results from last 7 days   Lab Units 07/06/25  1913   ABO TYPING  O   RH TYPING  Negative   ANTIBODY SCREEN  Positive        Assessment/Plan:      25   Renetta is a 35 y.o.    at 28w4d with Vaginal bleeding     Medical Problems       Hospital Problem List       * (Principal) Vaginal bleeding    Rh negative, antepartum    AMA (advanced maternal age) multigravida 35+    Multigravida in second trimester        Cont ANCS's - BMZ #2 at 1951  Cont Magnesium until steroid window  VTE prophylaxis, SCDs    D/w patient and answered all questions to the best of my ability.      Osiris Rubi MD  2025 20:16 EDT    Electronically signed by Osiris Rubi MD at 25

## 2025-07-08 NOTE — PROGRESS NOTES
Daily Progress Note    Patient name: Renetta Garrido  YOB: 1990   MRN: [id number]  Referring Provider: Osiris Rubi  Admission Date: 2025  Date of Service: 2025    Renetta Garrido is a 35 y.o.    at 28w5d  admitted on 2025 for Vaginal bleeding    Hospital day 2    Diagnoses:     Vaginal bleeding    Rh negative, antepartum    AMA (advanced maternal age) multigravida 35+    Multigravida in second trimester       Prenatal Labs  Lab Results   Component Value Date    HGB 12.3 2025    HEPBSAG Negative 2025    ABO O 2025    RH Negative 2025    ABSCRN Positive 2025    [id number] Non Reactive 2025    HEPCVIRUSABY Non Reactive 2025    URINECX 50,000 CFU/mL Normal Urogenital Connie 03/15/2025       Subjective:      Renetta has no complaints today.  Reports fetal movement is normal  No contractions  Denies leakage of amniotic fluid.  Reports vaginal bleeding, describes as brown discharge    Review of Systems - all other systems reviewed and negative    Objective:     Vital signs:  Vital Signs Range for the last 24 hours  Temperature: Temp:  [97.9 °F (36.6 °C)-98.6 °F (37 °C)] 97.9 °F (36.6 °C)   Temp Source: Temp src: Oral   BP: BP: (0-109)/(0-70) 105/69   Pulse: Heart Rate:  [65-89] 71   Respirations: Resp:  [16-18] 17   Weight: 96.6 kg (213 lb)     General: Alert & oriented x4, in no apparent distress  HEENT: Grossly normal  Resp: Unlabored breathing  Abdomen: Soft, nontender  Uterus: Gravid, nontender  Extremities: Nontender, no pain, no edema   Neurologic:  Alert & oriented x 4,  no focal deficits noted  Psychiatric:  Speech and behavior appropriate     Non-Stress Test:  Fetal Heart Rate Assessment   Method: Fetal HR Assessment Method: external   Beats/min: Fetal HR (beats/min): 135   Baseline: Fetal HR Baseline: normal range   Variability: Fetal HR Variability: moderate (amplitude range 6 to 25 bpm)   Accels: Fetal HR Accelerations:  greater than/equal to 15 bpm, lasting at least 15 seconds   Decels: Fetal HR Decelerations: absent   Tracing Category:       Uterine Assessment   Method: Method: external tocotransducer   Frequency (min):     Ctx Count in 10 min:     Duration:     Intensity: Contraction Intensity: no contractions   Intensity by IUPC:     Resting Tone: Uterine Resting Tone: soft by palpation   Resting Tone by IUPC:     [place] Units:       Cervix: Exam by: Method: sterile vaginal exam performed   Dilation:     Effacement:     Station:         Most recent ultrasound:  [facility] this AM: Transverse baby but Funic presentation, Area of small abruption 2.3 x 0.6cm.  Normal dopplers  EFW 17%ile: 2lb 8oz; 1144gm    Medications:  prenatal vitamin, 1 tablet, Oral, Daily  sodium chloride, 10 mL, Intravenous, Q12H  valACYclovir, 1,000 mg, Oral, Q24H         acetaminophen    bisacodyl    calcium gluconate    docusate sodium    lidocaine PF 1%    ondansetron ODT **OR** ondansetron    sodium chloride    sodium chloride    zolpidem    Labs:  Lab Results   Component Value Date    WBC 7.88 2025    HGB 12.3 2025    HCT 34.7 2025    MCV 95.1 2025     2025    CREATININE 0.48 (L) 2025    URICACID 4.1 2025    AST 14 2025    ALT 10 2025     2025     Results from last 7 days   Lab Units 25  1913   ABO TYPING  O   RH TYPING  Negative   ANTIBODY SCREEN  Positive       Assessment/Plan:      25   Renetta is a 35 y.o.    at 28w5d with Vaginal bleeding     Medical Problems       Hospital Problem List       * (Principal) Vaginal bleeding    Rh negative, antepartum    AMA (advanced maternal age) multigravida 35+    Multigravida in second trimester          Now steroid complete.  Will be in window ~19:50.  Magnesium discontinued a little early.    Abruption is small and at this gestation, discussed would consider indocin course if she were to start cramping/johanna.     Will keep patient until definitely stable.  Home likely Thurs, possibly tomorrow if so.  Reviewed pelvic rest, modified bed rest, no lifting/straining.    Osiris Rubi MD  07/08/25  17:20 EDT      Osiris Rubi MD  7/8/2025 17:16 EDT

## 2025-07-09 LAB
BH BB BLOOD EXPIRATION DATE: NORMAL
BH BB BLOOD EXPIRATION DATE: NORMAL
BH BB BLOOD TYPE BARCODE: 9500
BH BB BLOOD TYPE BARCODE: 9500
BH BB DISPENSE STATUS: NORMAL
BH BB DISPENSE STATUS: NORMAL
BH BB PRODUCT CODE: NORMAL
BH BB PRODUCT CODE: NORMAL
BH BB UNIT NUMBER: NORMAL
BH BB UNIT NUMBER: NORMAL
CROSSMATCH INTERPRETATION: NORMAL
CROSSMATCH INTERPRETATION: NORMAL
UNIT  ABO: NORMAL
UNIT  ABO: NORMAL
UNIT  RH: NORMAL
UNIT  RH: NORMAL

## 2025-07-09 PROCEDURE — 59025 FETAL NON-STRESS TEST: CPT

## 2025-07-09 RX ORDER — BUSPIRONE HYDROCHLORIDE 5 MG/1
5 TABLET ORAL 3 TIMES DAILY PRN
Status: DISCONTINUED | OUTPATIENT
Start: 2025-07-09 | End: 2025-07-10 | Stop reason: HOSPADM

## 2025-07-09 RX ADMIN — ACETAMINOPHEN 650 MG: 325 TABLET ORAL at 13:47

## 2025-07-09 RX ADMIN — DOCUSATE SODIUM 100 MG: 100 CAPSULE, LIQUID FILLED ORAL at 20:38

## 2025-07-09 RX ADMIN — POLYETHYLENE GLYCOL 3350 17 G: 17 POWDER, FOR SOLUTION ORAL at 08:08

## 2025-07-09 RX ADMIN — PRENATAL VITAMINS-IRON FUMARATE 27 MG IRON-FOLIC ACID 0.8 MG TABLET 1 TABLET: at 08:08

## 2025-07-09 RX ADMIN — VALACYCLOVIR 1000 MG: 500 TABLET, FILM COATED ORAL at 08:09

## 2025-07-09 RX ADMIN — DOCUSATE SODIUM 100 MG: 100 CAPSULE, LIQUID FILLED ORAL at 08:08

## 2025-07-09 RX ADMIN — BISACODYL 10 MG: 10 SUPPOSITORY RECTAL at 10:31

## 2025-07-09 RX ADMIN — Medication 10 ML: at 08:00

## 2025-07-09 NOTE — PROGRESS NOTES
Daily Progress Note    Patient name: Renetta Garrido  YOB: 1990   MRN: [id number]  Referring Provider: Osiris Rubi  Admission Date: 2025  Date of Service: 2025    Renetta Garrido is a 35 y.o.    at 28w6d  admitted on 2025 for Vaginal bleeding    Hospital day 3    Diagnoses:     Vaginal bleeding    Rh negative, antepartum    AMA (advanced maternal age) multigravida 35+    Multigravida in second trimester       Prenatal Labs  Lab Results   Component Value Date    HGB 12.3 2025    HEPBSAG Negative 2025    ABO O 2025    RH Negative 2025    ABSCRN Positive 2025    [id number] Non Reactive 2025    HEPCVIRUSABY Non Reactive 2025    URINECX 50,000 CFU/mL Normal Urogenital Connie 03/15/2025       Subjective:      Renetta has not had a BM despite miralax, juice  Reports fetal movement is normal  Some cramping, unsure if from constipation  Denies leakage of amniotic fluid.  Reports vaginal bleeding, describes as brown discharge    Review of Systems - all other systems reviewed and negative    Objective:     Vital signs:  Vital Signs Range for the last 24 hours  Temperature: Temp:  [97.9 °F (36.6 °C)-99.2 °F (37.3 °C)] 98.6 °F (37 °C)   Temp Source: Temp src: Oral   BP: BP: ()/(54-76) 109/76   Pulse: Heart Rate:  [65-77] 71   Respirations: Resp:  [16-18] 16   Weight: 96.6 kg (213 lb)     General: Alert & oriented x4, in no apparent distress  HEENT: Grossly normal  Resp: Unlabored breathing  Abdomen: Soft, nontender  Uterus: Gravid, nontender  Extremities: Nontender, no pain, no edema   Neurologic:  Alert & oriented x 4,  no focal deficits noted  Psychiatric:  Speech and behavior appropriate     Non-Stress Test:     Reactive  Time: 30min  Indication: vaginal bleeding, marginal abruption    Fetal Heart Rate Assessment   Method: Fetal HR Assessment Method: external   Beats/min: Fetal HR (beats/min): 145   Baseline: Fetal HR Baseline: normal  range   Variability: Fetal HR Variability: moderate (amplitude range 6 to 25 bpm)   Accels: Fetal HR Accelerations: greater than/equal to 15 bpm, lasting at least 15 seconds   Decels: Fetal HR Decelerations: absent   Tracing Category:       Uterine Assessment   Method: Method: external tocotransducer   Frequency (min):     Ctx Count in 10 min:     Duration:     Intensity: Contraction Intensity: no contractions   Intensity by IUPC:     Resting Tone: Uterine Resting Tone: soft by palpation   Resting Tone by IUPC:     [place] Units:       Cervix: Exam by: Method: sterile vaginal exam performed   Dilation:     Effacement:     Station:         Most recent ultrasound:  Marginal abruption noted.  Baby transverse, 22%ile, cord presenting    Medications:  polyethylene glycol, 17 g, Oral, Daily  prenatal vitamin, 1 tablet, Oral, Daily  sodium chloride, 10 mL, Intravenous, Q12H  valACYclovir, 1,000 mg, Oral, Q24H         acetaminophen    bisacodyl    docusate sodium    lidocaine PF 1%    ondansetron ODT **OR** ondansetron    sodium chloride    sodium chloride    zolpidem    Labs:  Lab Results   Component Value Date    WBC 7.88 2025    HGB 12.3 2025    HCT 34.7 2025    MCV 95.1 2025     2025    CREATININE 0.48 (L) 2025    URICACID 4.1 2025    AST 14 2025    ALT 10 2025     2025     Results from last 7 days   Lab Units 25  1913   ABO TYPING  O   RH TYPING  Negative   ANTIBODY SCREEN  Positive       Assessment/Plan:      25   Renetta is a 35 y.o.    at 28w6d with Vaginal bleeding     Medical Problems       Hospital Problem List       * (Principal) Vaginal bleeding    Rh negative, antepartum    AMA (advanced maternal age) multigravida 35+    Multigravida in second trimester        Stable off mag mostly since last night but cramping this AM.  In steroid window  Needs bowel regimen.  Will continue to monitor.  Consider indocin  course      Osiris Rubi MD  7/9/2025 10:12 EDT

## 2025-07-10 VITALS
OXYGEN SATURATION: 99 % | HEIGHT: 66 IN | RESPIRATION RATE: 16 BRPM | BODY MASS INDEX: 34.23 KG/M2 | HEART RATE: 71 BPM | WEIGHT: 213 LBS | SYSTOLIC BLOOD PRESSURE: 109 MMHG | TEMPERATURE: 97.9 F | DIASTOLIC BLOOD PRESSURE: 67 MMHG

## 2025-07-10 PROCEDURE — 59025 FETAL NON-STRESS TEST: CPT

## 2025-07-10 PROCEDURE — 99238 HOSP IP/OBS DSCHRG MGMT 30/<: CPT | Performed by: OBSTETRICS & GYNECOLOGY

## 2025-07-10 RX ORDER — BUSPIRONE HYDROCHLORIDE 5 MG/1
5 TABLET ORAL 3 TIMES DAILY PRN
Qty: 30 TABLET | Refills: 2 | Status: SHIPPED | OUTPATIENT
Start: 2025-07-10

## 2025-07-10 RX ORDER — PSEUDOEPHEDRINE HCL 30 MG
100 TABLET ORAL 2 TIMES DAILY PRN
Qty: 60 EACH | Refills: 2 | Status: SHIPPED | OUTPATIENT
Start: 2025-07-10

## 2025-07-10 RX ADMIN — POLYETHYLENE GLYCOL 3350 17 G: 17 POWDER, FOR SOLUTION ORAL at 08:04

## 2025-07-10 RX ADMIN — PRENATAL VITAMINS-IRON FUMARATE 27 MG IRON-FOLIC ACID 0.8 MG TABLET 1 TABLET: at 08:04

## 2025-07-10 RX ADMIN — VALACYCLOVIR 1000 MG: 500 TABLET, FILM COATED ORAL at 08:04

## 2025-07-10 RX ADMIN — DOCUSATE SODIUM 100 MG: 100 CAPSULE, LIQUID FILLED ORAL at 09:28

## 2025-07-10 NOTE — DISCHARGE SUMMARY
Date of Discharge:  7/10/2025    Discharge Diagnosis: IUP @ 29wk, marginal placental abruption, funic presentation    Presenting Problem/History of Present Illness  Vaginal bleeding [N93.9]       Hospital Course  Patient is a 35 y.o. female presented @ 28wk2d  with vaginal bleeding.  She was found to have a marginal abruption.  She was started on magnesium, given  corticosteroid and rhogam.  She remained stable and on , when near steroid window, her magnesium was turned off.  The next day, she was cramping and so observed. But cramping was felt to be 2/2 constipation and so after she was given meds to have a BM and did so, she felt much better.  And so on 7/10/25, she was discharged to home.    Procedures Performed  Mag   steroids       Consults:   Consults       No orders found from 2025 to 2025.            Pertinent Test Results: ultrasound at [facility] showed marginal abruption, funic presentation and SGA infant with head sparing.    Condition on Discharge:  stable    Vital Signs  Temp:  [97.9 °F (36.6 °C)-98.4 °F (36.9 °C)] 97.9 °F (36.6 °C)  Heart Rate:  [57-71] 71  Resp:  [15-16] 16  BP: ()/(51-67) 109/67      Discharge Disposition  Home or Self Care    Discharge Medications     Discharge Medications        New Medications        Instructions Start Date   busPIRone 5 MG tablet  Commonly known as: BUSPAR   5 mg, Oral, 3 Times Daily PRN      Stool Softener 100 MG capsule  Generic drug: docusate sodium   100 mg, Oral, 2 Times Daily PRN             Continue These Medications        Instructions Start Date   aspirin 81 MG EC tablet   81 mg, Daily      Magnesium Oxide -Mg Supplement 400 (240 Mg) MG tablet   200 mg, Oral, Daily      PRENATAL VIT-FE BISG-FA-DHA PO   Take  by mouth.      PROBIOTIC PEARLS WOMENS PO   Take  by mouth.      tobramycin 0.3 % solution ophthalmic solution  Commonly known as: Tobrex   2 drops, Left Eye, 3 times daily      valACYclovir 1000 MG tablet  Commonly known as:  Valtrex   1,000 mg, Oral, Daily               Discharge Diet:     Activity at Discharge:     Follow-up Appointments  Future Appointments   Date Time Provider Department Center   7/31/2025 10:30 AM Radha Burks APRN [facility]   9/24/2025  5:00 AM [facility] LD INDUCTION ROOM 2 [facility]         Test Results Pending at Discharge       Osiris Rubi MD  07/10/25  22:59 EDT    Time: Discharge <30 min

## 2025-07-10 NOTE — DISCHARGE INSTRUCTIONS
Return to labor hunt if any of the following occur: decreased fetal movement, vaginal bleeding, leaking of fluid

## 2025-07-10 NOTE — PAYOR COMM NOTE
"Renetta Garrido (35 y.o. Female)     From:Meghann Riley LPN, Utilization Review  Phone #[phone]  Fax #[phone]    Auth#[id number]     Discharged 7/10/25.          Date of Birth   1990    Social Security Number       Address   [address] [facility] [address]    Home Phone       MRN   [id number]       Hindu   Sabianist    Marital Status                               Admission Date   7/6/2025    Admission Type   Emergency    Admitting Provider   Osiris Rubi MD    Attending Provider       Department, Room/Bed   [facility] ANTEPARTUM, N327/1       Discharge Date   7/10/2025    Discharge Disposition   Home or Self Care    Discharge Destination                                 Attending Provider: (none)   Allergies: No Known Allergies    Isolation: None   Infection: None   Code Status: CPR    Ht: 167.6 cm (66\")   Wt: 96.6 kg (213 lb)    Admission Cmt: None   Principal Problem: Vaginal bleeding [N93.9]                   Active Insurance as of 7/6/2025       Primary Coverage       Payor Plan Insurance Group Employer/Plan Group      PRIME        Payor Plan Address Payor Plan Phone Number Payor Plan Fax Number Effective Dates    [address]   9/18/2023 - None Entered    ATTN: NEW CLAIMS       [facility] [place] [address]         Subscriber Name Subscriber Birth Date Member ID       MARISOL GARRIDO LIZET 6/24/1992 [id number]                     Emergency Contacts        (Rel.) Home Phone Work Phone Mobile Phone    MARISOL GARRIDO (Spouse) -- -- [phone]              Discharge Summary    No notes of this type exist for this encounter.       "

## 2025-07-10 NOTE — PROGRESS NOTES
Daily Progress Note    Patient name: Renetta Garrido  YOB: 1990   MRN: [id number]  Referring Provider: Osiris Rubi  Admission Date: 2025  Date of Service: 7/10/2025    Renetta Garrido is a 35 y.o.    at 29w0d  admitted on 2025 for Vaginal bleeding    Hospital day 4    Diagnoses:     Vaginal bleeding    Rh negative, antepartum    AMA (advanced maternal age) multigravida 35+    Multigravida in second trimester       Prenatal Labs  Lab Results   Component Value Date    HGB 12.3 2025    HEPBSAG Negative 2025    ABO O 2025    RH Negative 2025    ABSCRN Positive 2025    [id number] Non Reactive 2025    HEPCVIRUSABY Non Reactive 2025    URINECX 50,000 CFU/mL Normal Urogenital Connie 03/15/2025       Subjective:      Renetta has no complaints today.  No longer cramping or spotting.  Good FM    Review of Systems - all other systems reviewed and negative    Objective:     Vital signs:  Vital Signs Range for the last 24 hours  Temperature: Temp:  [97.9 °F (36.6 °C)-98.4 °F (36.9 °C)] 97.9 °F (36.6 °C)   Temp Source: Temp src: Oral   BP: BP: ()/(51-72) 109/67   Pulse: Heart Rate:  [57-71] 71   Respirations: Resp:  [15-18] 16   Weight: 96.6 kg (213 lb)     General: Alert & oriented x4, in no apparent distress  HEENT: Grossly normal  Resp: Unlabored breathing  Abdomen: Soft, nontender  Uterus: Gravid, nontender  Extremities: Nontender, no pain, no edema   Neurologic:  Alert & oriented x 4,  no focal deficits noted  Psychiatric:  Speech and behavior appropriate     Non-Stress Test:  Reactive  Time: >20min  Indication: placental abruption        Fetal Heart Rate Assessment   Method: Fetal HR Assessment Method: external   Beats/min: Fetal HR (beats/min): 155   Baseline: Fetal HR Baseline: normal range   Variability: Fetal HR Variability: moderate (amplitude range 6 to 25 bpm)   Accels: Fetal HR Accelerations: greater than/equal to 15 bpm, lasting  at least 15 seconds   Decels: Fetal HR Decelerations: absent   Tracing Category:       Uterine Assessment   Method: Method: external tocotransducer   Frequency (min):     Ctx Count in 10 min:     Duration:     Intensity: Contraction Intensity: no contractions   Intensity by IUPC:     Resting Tone: Uterine Resting Tone: soft by palpation   Resting Tone by IUPC:     [place] Units:       Cervix: Exam by: Method: sterile vaginal exam performed   Dilation:     Effacement:     Station:         Medications:  polyethylene glycol, 17 g, Oral, Daily  prenatal vitamin, 1 tablet, Oral, Daily  sodium chloride, 10 mL, Intravenous, Q12H  valACYclovir, 1,000 mg, Oral, Q24H         acetaminophen    bisacodyl    busPIRone    docusate sodium    lidocaine PF 1%    ondansetron ODT **OR** ondansetron    sodium chloride    sodium chloride    zolpidem    Labs:  Lab Results   Component Value Date    WBC 7.88 2025    HGB 12.3 2025    HCT 34.7 2025    MCV 95.1 2025     2025    CREATININE 0.48 (L) 2025    URICACID 4.1 2025    AST 14 2025    ALT 10 2025     2025     Results from last 7 days   Lab Units 25   ABO TYPING  O   RH TYPING  Negative   ANTIBODY SCREEN  Positive       Assessment/Plan:      07/10/25   Renetta is a 35 y.o.    at 29w0d with Vaginal bleeding , marginal abruption.    Medical Problems       Hospital Problem List       * (Principal) Vaginal bleeding    Rh negative, antepartum    AMA (advanced maternal age) multigravida 35+    Multigravida in second trimester        Stable for discharge on pelvic rest.  F/u 1wk        Osiris Rubi MD  7/10/2025 09:45 EDT

## 2025-07-10 NOTE — NURSING NOTE
Pt discharged home per MD order. RN instructed pt to return to labor hunt if any of the following occur: decreased fetal movement, vaginal bleeding, leaking of fluid. Pt verbalized understanding. Pt ambulating off unit in stable condition with all personal belongings.

## 2025-07-11 ENCOUNTER — TELEPHONE (OUTPATIENT)
Dept: OBSTETRICS AND GYNECOLOGY | Facility: CLINIC | Age: 35
End: 2025-07-11

## 2025-07-11 ENCOUNTER — DOCUMENTATION (OUTPATIENT)
Dept: OBSTETRICS AND GYNECOLOGY | Facility: CLINIC | Age: 35
End: 2025-07-11
Payer: OTHER GOVERNMENT

## 2025-07-11 ENCOUNTER — HOSPITAL ENCOUNTER (EMERGENCY)
Facility: HOSPITAL | Age: 35
Discharge: HOME | End: 2025-07-11
Attending: OBSTETRICS & GYNECOLOGY | Admitting: OBSTETRICS & GYNECOLOGY
Payer: OTHER GOVERNMENT

## 2025-07-11 VITALS
SYSTOLIC BLOOD PRESSURE: 111 MMHG | TEMPERATURE: 97.8 F | HEART RATE: 75 BPM | DIASTOLIC BLOOD PRESSURE: 74 MMHG | RESPIRATION RATE: 16 BRPM

## 2025-07-11 VITALS — BODY MASS INDEX: 33.99 KG/M2 | WEIGHT: 210.6 LBS | DIASTOLIC BLOOD PRESSURE: 70 MMHG | SYSTOLIC BLOOD PRESSURE: 108 MMHG

## 2025-07-11 DIAGNOSIS — F41.9 ANXIETY: ICD-10-CM

## 2025-07-11 DIAGNOSIS — O45.93 PLACENTAL ABRUPTION, THIRD TRIMESTER: ICD-10-CM

## 2025-07-11 DIAGNOSIS — O09.523 MULTIGRAVIDA OF ADVANCED MATERNAL AGE IN THIRD TRIMESTER: ICD-10-CM

## 2025-07-11 DIAGNOSIS — O26.899 RH NEGATIVE, ANTEPARTUM: Primary | ICD-10-CM

## 2025-07-11 DIAGNOSIS — Z67.91 RH NEGATIVE, ANTEPARTUM: Primary | ICD-10-CM

## 2025-07-11 DIAGNOSIS — O42.90 AMNIOTIC FLUID LEAKING: ICD-10-CM

## 2025-07-11 PROCEDURE — 84112 EVAL AMNIOTIC FLUID PROTEIN: CPT | Performed by: OBSTETRICS & GYNECOLOGY

## 2025-07-11 PROCEDURE — 99282 EMERGENCY DEPT VISIT SF MDM: CPT | Performed by: OBSTETRICS & GYNECOLOGY

## 2025-07-11 NOTE — TELEPHONE ENCOUNTER
Caller: Renetta Garrido    Relationship: Self    Best call back number: [phone]     What is the best time to reach you: CALL ANYTIME    Who are you requesting to speak with (clinical staff, provider,  specific staff member): FIRST AVAILABLE     PATIENT IS REQUESTING A CALL BACK TO SEE IF OB APPT IS AVAILABLE FOR ANYTIME TODAY AT [facility] OFFICE. LISA PATIENT 29 WEEKS ONE DAY. PATIENT WAS DISCHARGED FROM HOSPITAL YESTERDAY FOR MARGINAL PLACENTAL ABRUPTION AND TOLD TO WATCH FOR AMNIOTIC FLUID LEAKING.    THIS MORNING WHILE STANDING FELT SOMETHING LEAKING. NOT SURE IF AMNIOTIC FLUID OR URINE. NO SMELL. HASN'T HAPPENED AGAIN. LIGHT AMOUNT DID NOT NEED TO CHANGE CLOTHING.  NO VAGINAL BLEEDING.     HUB SPOKE WITH [facility] OFFICE, NO AVAILABILITY FOR TODAYU. WAS TOLD TO CHECK WITH [facility] OR PATIENT GO BACK TO HOSPITAL. [facility] ATTEMPTED TO CALL CLINICAL.PATIENT PREFERS TO BE SEEN IN OFFICE INSTEAD OF GOING TO HOSPITAL DUE TO BEING UNSURE IS FLUID WAS URINE. IF LEAKING HAPPENS AGAIN, PATIENT STATED WILL GO TO HOSPITAL.

## 2025-07-11 NOTE — PROGRESS NOTES
Hub called patient thinking she is leaking fluid was discharged from hospital yesterday hub will call to see if can get in in [place] or she can go back to [facility]

## 2025-07-11 NOTE — H&P
"[facility]  Obstetric History and Physical    Referring Provider: Osiris Rubi*      Chief Complaint   Patient presents with    Rupture of Membranes       HPI:      Patient is a 35 y.o. female  currently at 29w1d, who presents from the office after notifying them that she has had increased clear discharged and was concerned about her water breaking.   She was admitted earlier this week with vaginal bleeding and was found to have a marginal abruption. She received Magnesium and BMZ x2 being discharged in stable condition yesterday.              Prenatal Information:   Maternal Prenatal Labs  Blood Type No results found for: \"ABO\"   Rh Status No results found for: \"RH\"   Antibody Screen No results found for: \"ABSCRN\"   Gonnorhea No results found for: \"GCCX\"   Chlamydia No results found for: \"CLAMYDCU\"   RPR No results found for: \"RPR\"   Syphilis Antibody No results found for: \"SYPHILIS\"   Rubella No results found for: \"RUBELLAIGGIN\"   Hepatitis B Surface Antigen No results found for: \"HEPBSAG\"   HIV-1 Antibody No results found for: \"LABHIV1\"   Hepatitis C Antibody No results found for: \"HEPCAB\"   Rapid Urin Drug Screen No results found for: \"AMPMETHU\", \"BARBITSCNUR\", \"LABBENZSCN\", \"LABMETHSCN\", \"LABOPIASCN\", \"THCURSCR\", \"COCAINEUR\", \"AMPHETSCREEN\", \"PROPOXSCN\", \"BUPRENORSCNU\", \"METAMPSCNUR\", \"OXYCODONESCN\", \"TRICYCLICSCN\"   Group B Strep Culture No results found for: \"GBSANTIGEN\"           External Prenatal Results       Pregnancy Outside Results - Transcribed From Office Records - See Scanned Records For Details       Test Value Date Time    ABO  O  25    Rh  Negative  25    Antibody Screen  Positive  25       Negative  25 1129       Negative  25 0928    Varicella IgG       Rubella  <0.90 index 25 0928    Hgb  12.3 g/dL 25 0348       11.9 g/dL 25 1914       12.6 g/dL 25 1129       13.2 g/dL 03/15/25 1122       13.6 g/dL 25 " 0928    Hct  34.7 % 25 0348       33.4 % 25 1914       36.8 % 25 1129       37.2 % 03/15/25 1122       40.5 % 25 09    HgB A1c        1h GTT  72 mg/dL 25 1129    3h GTT Fasting       3h GTT 1 hour       3h GTT 2 hour       3h GTT 3 hour        Gonorrhea (discrete)  Negative  25    Chlamydia (discrete)  Negative  25    RPR  Non Reactive  25       Non Reactive  25    Syphils cascade: TP-Ab (FTA)  Non-Reactive  25    TP-Ab  Non-Reactive  25    TP-Ab (EIA)       TPPA       HBsAg  Negative  25    Herpes Simplex Virus PCR       Herpes Simplex VIrus Culture       HIV  Non Reactive  25    Hep C RNA Quant PCR       Hep C Antibody  Non Reactive  25    AFP       NIPT       Cystic Fibrosis ([place])       Cystic Fibroisis        Spinal Muscular atrophy       Fragile X       Group B Strep       GBS Susceptibility to Clindamycin       GBS Susceptibility to Erythromycin       Fetal Fibronectin       Genetic Testing, Maternal Blood                 Drug Screening       Test Value Date Time    Urine Drug Screen       Amphetamine Screen  Negative ng/mL 25    Barbiturate Screen  Negative ng/mL 25    Benzodiazepine Screen  Negative ng/mL 25    Methadone Screen  Negative ng/mL 25    Phencyclidine Screen  Negative ng/mL 25    Opiates Screen       THC Screen       Cocaine Screen       Propoxyphene Screen  Negative ng/mL 25    Buprenorphine Screen       Methamphetamine Screen       Oxycodone Screen       Tricyclic Antidepressants Screen                 Legend    ^: Historical                              Past OB History:       OB History    Para Term  AB Living   7 3 3 0 3 3   SAB IAB Ectopic Molar Multiple Live Births   3 0 0 0 0 3      # Outcome Date GA Lbr Mitul/2nd Weight Sex Type Anes PTL Lv   7 Current            6 Term  39w0d     Vag-Spont   BRENNAN   5 Term  40w0d    Vag-Spont   BRENNAN   4 Term  40w0d    Vag-Spont   BRENNAN   3 SAB            2 SAB            1 SAB                Past Medical History: Past Medical History:   Diagnosis Date    Abnormal Pap smear of cervix     ADHD (attention deficit hyperactivity disorder) 2008    Haven’t taken meds for it since 2016    Anemia     Had to be put on iron in college and then slow fe during pregnancies    Antepartum placental abruption 07/06/2025    d/c from apu 7/10/25    Elbow fracture, right     Female infertility     Herpes     Hsv1    Multiple gestation     Ovarian cyst       Past Surgical History Past Surgical History:   Procedure Laterality Date    COLONOSCOPY  09/2024    HYSTEROSCOPY  05/2018    hysteroscopic metroplasty Semi septate uterus    UTERINE SEPTUM REMOVAL  2017    semi septate      Family History: Family History   Problem Relation Age of Onset    Obesity Father     Cancer Father         Colon cancer    Colon cancer Father     Hypertension Mother     Cancer Paternal Grandmother         My gpa had thyroid cancer      Social History:  reports that she quit smoking about 11 years ago. Her smoking use included cigarettes. She started smoking about 17 years ago. She has a 1.5 pack-year smoking history. She has never used smokeless tobacco.   reports that she does not currently use alcohol after a past usage of about 1.0 - 2.0 standard drink of alcohol per week.   reports no history of drug use.                   General ROS Negative Findings:  All pertinent positives and negatives are addressed in the HPI        Vital Signs Range for the last 24 hours  Temperature: Temp:  [97.8 °F (36.6 °C)] 97.8 °F (36.6 °C)   Temp Source: Temp src: Oral   BP: BP: (108-111)/(70-74) 111/74   Pulse: Heart Rate:  [75] 75   Respirations: Resp:  [16] 16   SPO2:     O2 Amount (l/min):     O2 Devices     Weight: Weight:  [95.5 kg (210 lb 9.6 oz)] 95.5 kg (210 lb 9.6 oz)     Physical Examination:   General:   alert,  appears stated age, and cooperative            Chest: CTAB    Heart:   Regular rate    Abdominal  Soft, non-tender,  gravid uterus    Lower Extremities: +1 edema                          Fetal Heart Rate Assessment   Method: Fetal HR Assessment Method: external   Beats/min: Fetal HR (beats/min): 150   Baseline:     Varibility:     Accels:     Decels:     Tracing Category:       Uterine Assessment   Method: Method: external tocotransducer (efm applied)   Frequency (min):     Ctx Count in 10 min:     Duration:     Intensity:     Intensity by IUPC:     Resting Tone: Uterine Resting Tone: soft by palpation   Resting Tone by IUPC:     [place] Units:       Laboratory Results:   Lab Results (last 24 hours)       ** No results found for the last 24 hours. **          Radiology Review:   Imaging Results (Last 24 Hours)       ** No results found for the last 24 hours. **          Other Studies:      AmniSure:  NEGATIVE     USN:   GEGE 13          Assessment & Plan             CORINA Course / Assessment/Plan  1.   All lab and imaging results listed above have been reviewed by me.  2.   Intrauterine pregnancy at 29w1d gestation with reactive fetal status.    3.   Vaginal discharge with no evidence of PPROM -= Negative testing and normal fluid   4.  Given education and reassurance  -   5.  Questions answered   6.  D/C Home           Rufino Humphrey MD  7/11/2025  12:33 EDT

## 2025-07-11 NOTE — PROGRESS NOTES
CC- Work In OB       Renetta Garrido is a 35 y.o.  29w1d patient being seen today for possible leaking fluid. This morning had a trickle of clear fluid from her vagina.       Her prenatal care is complicated by (and status) :    Patient Active Problem List   Diagnosis    Closed displaced fracture of head of right radius    Injury of triangular fibrocartilage complex of right wrist    Sprain of anterior talofibular ligament of left ankle    HSV infection    Rh negative, antepartum    AMA (advanced maternal age) multigravida 35+    Multigravida in second trimester    Vaginal bleeding    Placental abruption, third trimester    Amniotic fluid leaking    Anxiety         Ultrasound Today: No.    ROS -   Patient Reports : possible water leakage  Patient Denies: Vaginal Spotting, Vision Changes, Headaches, Nausea , Vomiting , Contractions, Epigastric pain, and skin itching  Fetal Movement : normal  All other systems reviewed and are negative.       The additional following portions of the patient's history were reviewed and updated as appropriate: allergies, current medications, past family history, past medical history, past social history, past surgical history, and problem list.    I have reviewed and agree with the HPI, ROS, and historical information as entered above. Osiris Rubi MD      /70   Wt 95.5 kg (210 lb 9.6 oz)   LMP 2024   BMI 33.99 kg/m²       EXAM:     Prenatal Vitals  BP: 108/70  Weight: 95.5 kg (210 lb 9.6 oz)   Fetal Heart Rate: pos      Fundal Height (cm): 28 cm         Assessment and Plan    Problem List Items Addressed This Visit       Rh negative, antepartum - Primary    AMA (advanced maternal age) multigravida 35+    Placental abruption, third trimester    Amniotic fluid leaking    Anxiety    Overview   Discharged with Buspar TID prn which she has not tried yet.            Pregnancy at 29w1d  Fetal status reassuring.   Reviewed needs amnisure and then cervical  exam and we do not have amnisure.  Hopefully just anxiety.  Return for Next scheduled follow up.    Osiris Rubi MD  07/11/2025

## 2025-07-11 NOTE — TELEPHONE ENCOUNTER
Elicia pt. 29w1d. Reports that she was discharged from hospital yesterday for marginal placenta abruption. She was told to call for any possible leaking of fluid.   Reports that she had possible leaking of fluid today. She states that she already had an appointment scheduled by someone she spoke to on the phone and is on her way to office.

## 2025-07-12 ENCOUNTER — NURSE TRIAGE (OUTPATIENT)
Dept: CALL CENTER | Facility: HOSPITAL | Age: 35
End: 2025-07-12
Payer: OTHER GOVERNMENT

## 2025-07-12 DIAGNOSIS — H10.32 ACUTE BACTERIAL CONJUNCTIVITIS OF LEFT EYE: ICD-10-CM

## 2025-07-12 RX ORDER — TOBRAMYCIN 3 MG/ML
2 SOLUTION/ DROPS OPHTHALMIC 3 TIMES DAILY
Qty: 5 ML | Refills: 0 | Status: SHIPPED | OUTPATIENT
Start: 2025-07-12

## 2025-07-12 NOTE — TELEPHONE ENCOUNTER
"Patient left  in triage line for medication refill of tobramycin ophthalmic solution.     Secure Chat Message sent to on call provider CINDY Beverly with medication refill request.     Patient call returned to update. Informed to check with her Pharmacy about NOON if she has not heard anything. If prescription has not be refilled please call back.       Reason for Disposition   [1] Prescription refill request for ESSENTIAL medicine (i.e., likelihood of harm to patient if not taken) AND [2] triager unable to refill per department policy    Additional Information   Negative: New-onset or worsening symptoms, see that guideline (e.g., diarrhea, runny nose, sore throat)   Negative: Medicine question not related to refill or renewal   Negative: Caller (e.g., patient or pharmacist) requesting information about a new medicine   Negative: Caller requesting information unrelated to medicine    Answer Assessment - Initial Assessment Questions  1. DRUG NAME: \"What medicine do you need to have refilled?\"      Tobramycin ophthalmic solution  2. REFILLS REMAINING: \"How many refills are remaining?\" (Note: The label on the medicine or pill bottle will show how many refills are remaining. If there are no refills remaining, then a renewal may be needed.)      none  3. EXPIRATION DATE: \"What is the expiration date?\" (Note: The label states when the prescription will , and thus can no longer be refilled.)      N/A  4. PRESCRIBING HCP: \"Who prescribed it?\" Reason: If prescribed by specialist, call should be referred to that group.      PCP  5. SYMPTOMS: \"Do you have any symptoms?\"      PINK EYE   6. PREGNANCY: \"Is there any chance that you are pregnant?\" \"When was your last menstrual period?\"      YES, 29W2D    Protocols used: Medication Refill and Renewal Call-ADULT-    "

## 2025-07-12 NOTE — TELEPHONE ENCOUNTER
"Spoke with patient who stated she was seen in office with conjunctivitis of the right eye and treated with antibiotic eye drops. Shortly after visit, the infection spread to her other eye. Symptoms resolved with use of antibiotic drops. Patient states she forgot to wash her \"pregnancy pillow\" and woke up last night with eye pain, redness, and eye discharge from her right eye. She has no antibiotic drops left. Informed patient that I would send in antibiotic drops but if symptoms fail to resolve, she will need to be seen at [facility] or our office during regular hours. Discussed signs/symptoms that warrant further ophthalmic evaluation.   "

## 2025-07-15 LAB — POC AMNISURE: NEGATIVE

## 2025-07-15 PROCEDURE — 84112 EVAL AMNIOTIC FLUID PROTEIN: CPT | Performed by: OBSTETRICS & GYNECOLOGY

## 2025-07-17 ENCOUNTER — ROUTINE PRENATAL (OUTPATIENT)
Dept: OBSTETRICS AND GYNECOLOGY | Facility: CLINIC | Age: 35
End: 2025-07-17
Payer: OTHER GOVERNMENT

## 2025-07-17 VITALS — BODY MASS INDEX: 34.48 KG/M2 | DIASTOLIC BLOOD PRESSURE: 72 MMHG | SYSTOLIC BLOOD PRESSURE: 112 MMHG | WEIGHT: 213.6 LBS

## 2025-07-17 DIAGNOSIS — O45.93 PLACENTAL ABRUPTION, THIRD TRIMESTER: ICD-10-CM

## 2025-07-17 DIAGNOSIS — Z34.83 PRENATAL CARE, SUBSEQUENT PREGNANCY IN THIRD TRIMESTER: Primary | ICD-10-CM

## 2025-07-17 LAB
EXPIRATION DATE: NORMAL
GLUCOSE UR STRIP-MCNC: NEGATIVE MG/DL
Lab: NORMAL
PROT UR STRIP-MCNC: NEGATIVE MG/DL

## 2025-07-17 NOTE — PROGRESS NOTES
OB FOLLOW UP  CC- Here for care of pregnancy, NST for placental abruption        Renetta Garrido is a 35 y.o.  30w0d patient being seen today for her obstetrical follow up visit. Patient reports occasional hollie bull.     She denies LOF, vaginal spotting, headaches, vision changes, swelling.  She reports adequate fetal movements, >10 movements in 10 hours.      Her prenatal care is complicated by (and status) :   Patient Active Problem List   Diagnosis    Closed displaced fracture of head of right radius    Injury of triangular fibrocartilage complex of right wrist    Sprain of anterior talofibular ligament of left ankle    HSV infection    Rh negative, antepartum    AMA (advanced maternal age) multigravida 35+    Multigravida in second trimester    Vaginal bleeding    Placental abruption, third trimester    Amniotic fluid leaking    Anxiety     Ultrasound Today: No  Non Stress Test: Yes minutes >20  non-stress test: NST: Reactive  indication: Placental Abruption     ROS -   Patient Denies: Loss of Fluid, Vaginal Spotting, Vision Changes, Headaches, Nausea , Vomiting , Epigastric pain, and skin itching  Fetal Movement : normal  Other than what is documented in the HPI, all other systems reviewed and are negative.       The additional following portions of the patient's history were reviewed and updated as appropriate: allergies, current medications, past family history, past medical history, past social history, past surgical history, and problem list.    I have reviewed and agree with the HPI, ROS, and historical information as entered above. Radha Burks, APRN      /72   Wt 96.9 kg (213 lb 9.6 oz)   LMP 2024   BMI 34.48 kg/m²       EXAM:     Prenatal Vitals  BP: 112/72  Weight: 96.9 kg (213 lb 9.6 oz)   Fetal Heart Rate: NST               Urine Glucose Read-only: Negative  Urine Protein Read-only: Negative           Assessment and Plan    Problem List Items Addressed This Visit           Gravid and     Placental abruption, third trimester    Relevant Orders    [facility]     Other Visit Diagnoses         Prenatal care, subsequent pregnancy in third trimester    -  Primary    Relevant Orders    POC Glucose, Urine, Qualitative, Dipstick (Completed)    POC Protein, Urine, Qualitative, Dipstick (Completed)    [facility]          NST reactive, no contractions. She denies any bleeding since being discharged from the hospital. Bleeding precautions and restrictions reviewed.   She has not taken buspar. She feels she is managing adequately without it. I advised she can take it on an as needed or scheduled basis. She VU.    Pregnancy at 30w0d  Fetal status reassuring.   Activity and Exercise discussed.  Continue pelvic rest  PDC U/S ordered for 2 weeks  Fetal movement/PTL or Labor precautions  U/S ordered at follow up  Anxiety- discussed options for treatment  Return if symptoms worsen or fail to improve, for add U/S to  appt with JO.    Radha Burks, APRN  2025

## 2025-07-25 ENCOUNTER — OFFICE VISIT (OUTPATIENT)
Dept: OBSTETRICS AND GYNECOLOGY | Facility: HOSPITAL | Age: 35
End: 2025-07-25
Payer: OTHER GOVERNMENT

## 2025-07-25 ENCOUNTER — HOSPITAL ENCOUNTER (OUTPATIENT)
Dept: WOMENS IMAGING | Facility: HOSPITAL | Age: 35
Discharge: HOME | End: 2025-07-25
Admitting: NURSE PRACTITIONER
Payer: OTHER GOVERNMENT

## 2025-07-25 VITALS — SYSTOLIC BLOOD PRESSURE: 105 MMHG | BODY MASS INDEX: 35.12 KG/M2 | WEIGHT: 217.6 LBS | DIASTOLIC BLOOD PRESSURE: 72 MMHG

## 2025-07-25 DIAGNOSIS — Z34.83 PRENATAL CARE, SUBSEQUENT PREGNANCY IN THIRD TRIMESTER: ICD-10-CM

## 2025-07-25 DIAGNOSIS — O09.523 MULTIGRAVIDA OF ADVANCED MATERNAL AGE IN THIRD TRIMESTER: Primary | ICD-10-CM

## 2025-07-25 DIAGNOSIS — O45.93 PLACENTAL ABRUPTION, THIRD TRIMESTER: ICD-10-CM

## 2025-07-25 PROCEDURE — 76816 OB US FOLLOW-UP PER FETUS: CPT

## 2025-07-25 PROCEDURE — 76819 FETAL BIOPHYS PROFIL W/O NST: CPT

## 2025-07-25 NOTE — PROGRESS NOTES
Denies vaginal bleeding, leaking fluid, and contractions.   Endorses normal fetal movement.  NIPT declined.  Next OB follow-up appointment with Dr. Rubi on 7/30/2025.

## 2025-07-25 NOTE — PROGRESS NOTES
[facility] fetal [facility].    Please see ultrasound report under imaging tab of patient chart in Epic (Viewpoint report).    Ivy Torres MD

## 2025-07-28 ENCOUNTER — TELEPHONE (OUTPATIENT)
Dept: OBSTETRICS AND GYNECOLOGY | Facility: CLINIC | Age: 35
End: 2025-07-28
Payer: OTHER GOVERNMENT

## 2025-07-28 NOTE — TELEPHONE ENCOUNTER
Referral was placed on 7/17/2025 following appointment with Tawanna- called and reviewed this with patient, and she plans to call insurance again.  Will call back if any additional information given from americo.

## 2025-07-28 NOTE — TELEPHONE ENCOUNTER
Caller: Renetta Garrido    Relationship: Self    Best call back number: [phone]     What form or medical record are you requesting: REFERRAL FOR Ivy Torres MD AT [facility]    Who is requesting this form or medical record from you:  [facility]      Timeframe paperwork needed: ASAP     Additional notes: PT WENT AND SEEN [facility] ON 7/25/25 - JOSEFINA STATED DUE TO NOT HAVING A REFERRAL FOR APPT THEY WERE NOT SUBJECT TO HAVING TO PAY FOR APPT WHICH PT HAD TO PAY FOR CO PAY AND POSSIBLE BILL WITHOUT THE REFERRAL - PT WOULD LIKE TO KNOW IF A REFERRAL COULD BE SENT TO COVER THE POTENTIAL COST OF THE APPT  DUE TO SEEING MFM AS IT WAS DIRECTED BY PROVIDER    PLEASE CALL PT TO DISCUSS THE OUTCOME    THANK YOU

## 2025-07-29 DIAGNOSIS — O45.93 PLACENTAL ABRUPTION, THIRD TRIMESTER: Primary | ICD-10-CM

## 2025-07-30 ENCOUNTER — ROUTINE PRENATAL (OUTPATIENT)
Dept: OBSTETRICS AND GYNECOLOGY | Facility: CLINIC | Age: 35
End: 2025-07-30
Payer: OTHER GOVERNMENT

## 2025-07-30 VITALS — WEIGHT: 219 LBS | DIASTOLIC BLOOD PRESSURE: 70 MMHG | SYSTOLIC BLOOD PRESSURE: 100 MMHG | BODY MASS INDEX: 35.35 KG/M2

## 2025-07-30 DIAGNOSIS — O26.899 RH NEGATIVE, ANTEPARTUM: ICD-10-CM

## 2025-07-30 DIAGNOSIS — Z34.92 SECOND TRIMESTER PREGNANCY: Primary | ICD-10-CM

## 2025-07-30 DIAGNOSIS — O09.523 MULTIGRAVIDA OF ADVANCED MATERNAL AGE IN THIRD TRIMESTER: ICD-10-CM

## 2025-07-30 DIAGNOSIS — B00.9 HSV INFECTION: ICD-10-CM

## 2025-07-30 DIAGNOSIS — O45.93 PLACENTAL ABRUPTION, THIRD TRIMESTER: ICD-10-CM

## 2025-07-30 DIAGNOSIS — Z67.91 RH NEGATIVE, ANTEPARTUM: ICD-10-CM

## 2025-07-30 LAB
GLUCOSE UR STRIP-MCNC: NEGATIVE MG/DL
PROT UR STRIP-MCNC: NEGATIVE MG/DL

## 2025-08-14 ENCOUNTER — ROUTINE PRENATAL (OUTPATIENT)
Dept: OBSTETRICS AND GYNECOLOGY | Facility: CLINIC | Age: 35
End: 2025-08-14
Payer: OTHER GOVERNMENT

## 2025-08-14 VITALS — DIASTOLIC BLOOD PRESSURE: 68 MMHG | SYSTOLIC BLOOD PRESSURE: 102 MMHG | WEIGHT: 223 LBS | BODY MASS INDEX: 35.99 KG/M2

## 2025-08-14 DIAGNOSIS — Z20.818 EXPOSURE TO STREP THROAT: Primary | ICD-10-CM

## 2025-08-14 DIAGNOSIS — Z34.93 THIRD TRIMESTER PREGNANCY: ICD-10-CM

## 2025-08-14 DIAGNOSIS — Z3A.34 34 WEEKS GESTATION OF PREGNANCY: ICD-10-CM

## 2025-08-14 LAB
CLARITY, POC: CLEAR
COLOR UR: YELLOW
EXPIRATION DATE: NORMAL
GLUCOSE UR STRIP-MCNC: NEGATIVE MG/DL
INTERNAL CONTROL: NORMAL
Lab: NORMAL
PROT UR STRIP-MCNC: NEGATIVE MG/DL
S PYO AG THROAT QL: NEGATIVE

## 2025-08-28 ENCOUNTER — ROUTINE PRENATAL (OUTPATIENT)
Dept: OBSTETRICS AND GYNECOLOGY | Facility: CLINIC | Age: 35
End: 2025-08-28
Payer: OTHER GOVERNMENT

## 2025-08-28 VITALS — WEIGHT: 226.6 LBS | BODY MASS INDEX: 36.57 KG/M2 | DIASTOLIC BLOOD PRESSURE: 74 MMHG | SYSTOLIC BLOOD PRESSURE: 118 MMHG

## 2025-08-28 DIAGNOSIS — O26.899 RH NEGATIVE, ANTEPARTUM: ICD-10-CM

## 2025-08-28 DIAGNOSIS — O09.523 MULTIGRAVIDA OF ADVANCED MATERNAL AGE IN THIRD TRIMESTER: ICD-10-CM

## 2025-08-28 DIAGNOSIS — Z67.91 RH NEGATIVE, ANTEPARTUM: ICD-10-CM

## 2025-08-28 DIAGNOSIS — B00.9 HSV INFECTION: ICD-10-CM

## 2025-08-28 DIAGNOSIS — Z34.93 THIRD TRIMESTER PREGNANCY: Primary | ICD-10-CM

## 2025-08-28 LAB
EXPIRATION DATE: 0
GLUCOSE UR STRIP-MCNC: NEGATIVE MG/DL
Lab: 0
PROT UR STRIP-MCNC: NEGATIVE MG/DL

## 2025-08-31 PROBLEM — Z34.92 SECOND TRIMESTER PREGNANCY: Status: RESOLVED | Noted: 2025-07-30 | Resolved: 2025-08-31

## 2025-08-31 PROBLEM — Z34.82 MULTIGRAVIDA IN SECOND TRIMESTER: Status: RESOLVED | Noted: 2025-05-24 | Resolved: 2025-08-31

## 2025-08-31 PROBLEM — Z34.93 THIRD TRIMESTER PREGNANCY: Status: ACTIVE | Noted: 2025-08-31
